# Patient Record
Sex: FEMALE | Race: BLACK OR AFRICAN AMERICAN | NOT HISPANIC OR LATINO
[De-identification: names, ages, dates, MRNs, and addresses within clinical notes are randomized per-mention and may not be internally consistent; named-entity substitution may affect disease eponyms.]

---

## 2018-11-29 ENCOUNTER — ASOB RESULT (OUTPATIENT)
Age: 23
End: 2018-11-29

## 2018-11-29 ENCOUNTER — APPOINTMENT (OUTPATIENT)
Dept: OBGYN | Facility: CLINIC | Age: 23
End: 2018-11-29
Payer: COMMERCIAL

## 2018-11-29 VITALS
WEIGHT: 231.56 LBS | SYSTOLIC BLOOD PRESSURE: 136 MMHG | BODY MASS INDEX: 39.53 KG/M2 | DIASTOLIC BLOOD PRESSURE: 83 MMHG | HEIGHT: 64 IN | HEART RATE: 90 BPM

## 2018-11-29 DIAGNOSIS — Z78.9 OTHER SPECIFIED HEALTH STATUS: ICD-10-CM

## 2018-11-29 DIAGNOSIS — Z83.3 FAMILY HISTORY OF DIABETES MELLITUS: ICD-10-CM

## 2018-11-29 DIAGNOSIS — Z82.49 FAMILY HISTORY OF ISCHEMIC HEART DISEASE AND OTHER DISEASES OF THE CIRCULATORY SYSTEM: ICD-10-CM

## 2018-11-29 LAB
ABO + RH PNL BLD: NORMAL
BASOPHILS # BLD AUTO: 0.02 K/UL
BASOPHILS NFR BLD AUTO: 0.2 %
BLD GP AB SCN SERPL QL: NORMAL
CMV IGG SERPL QL: <0.2 U/ML
CMV IGG SERPL-IMP: NEGATIVE
CMV IGM SERPL QL: <8 AU/ML
CMV IGM SERPL QL: NEGATIVE
EOSINOPHIL # BLD AUTO: 0.13 K/UL
EOSINOPHIL NFR BLD AUTO: 1.4 %
HBV SURFACE AG SER QL: NONREACTIVE
HCT VFR BLD CALC: 40.4 %
HCV AB SER QL: NONREACTIVE
HCV S/CO RATIO: 0.06 S/CO
HGB BLD-MCNC: 13.2 G/DL
HIV1+2 AB SPEC QL IA.RAPID: NONREACTIVE
IMM GRANULOCYTES NFR BLD AUTO: 0.2 %
LYMPHOCYTES # BLD AUTO: 2.38 K/UL
LYMPHOCYTES NFR BLD AUTO: 25.7 %
MAN DIFF?: NORMAL
MCHC RBC-ENTMCNC: 27 PG
MCHC RBC-ENTMCNC: 32.7 GM/DL
MCV RBC AUTO: 82.6 FL
MONOCYTES # BLD AUTO: 0.47 K/UL
MONOCYTES NFR BLD AUTO: 5.1 %
NEUTROPHILS # BLD AUTO: 6.23 K/UL
NEUTROPHILS NFR BLD AUTO: 67.4 %
PLATELET # BLD AUTO: 323 K/UL
RBC # BLD: 4.89 M/UL
RBC # FLD: 15.4 %
T PALLIDUM AB SER QL IA: NEGATIVE
VZV AB TITR SER: NEGATIVE
VZV IGG SER IF-ACNC: 19.3 INDEX
WBC # FLD AUTO: 9.25 K/UL

## 2018-11-29 PROCEDURE — 99202 OFFICE O/P NEW SF 15 MIN: CPT

## 2018-11-29 PROCEDURE — 76817 TRANSVAGINAL US OBSTETRIC: CPT

## 2018-12-12 LAB
AR GENE MUT ANL BLD/T: NEGATIVE
B19V IGG SER QL IA: 0.2 INDEX
B19V IGG+IGM SER-IMP: NEGATIVE
B19V IGG+IGM SER-IMP: NORMAL
B19V IGM FLD-ACNC: 0.1 INDEX
B19V IGM SER-ACNC: NEGATIVE
BACTERIA UR CULT: NORMAL
C TRACH RRNA SPEC QL NAA+PROBE: NOT DETECTED
CFTR MUT TESTED BLD/T: NEGATIVE
FMR1 GENE MUT ANL BLD/T: NORMAL
HGB A MFR BLD: 97.4 %
HGB A2 MFR BLD: 2.6 %
HGB FRACT BLD-IMP: NORMAL
LEAD BLD-MCNC: <1 UG/DL
N GONORRHOEA RRNA SPEC QL NAA+PROBE: NOT DETECTED
RUBV IGG FLD-ACNC: 1.1 INDEX
RUBV IGG SER-IMP: POSITIVE
SOURCE AMPLIFICATION: NORMAL

## 2018-12-31 ENCOUNTER — APPOINTMENT (OUTPATIENT)
Dept: OBGYN | Facility: CLINIC | Age: 23
End: 2018-12-31
Payer: COMMERCIAL

## 2018-12-31 ENCOUNTER — NON-APPOINTMENT (OUTPATIENT)
Age: 23
End: 2018-12-31

## 2018-12-31 VITALS
BODY MASS INDEX: 40.97 KG/M2 | DIASTOLIC BLOOD PRESSURE: 76 MMHG | WEIGHT: 240 LBS | SYSTOLIC BLOOD PRESSURE: 143 MMHG | HEIGHT: 64 IN

## 2018-12-31 VITALS — SYSTOLIC BLOOD PRESSURE: 131 MMHG | DIASTOLIC BLOOD PRESSURE: 76 MMHG

## 2018-12-31 PROCEDURE — 0502F SUBSEQUENT PRENATAL CARE: CPT

## 2019-01-16 ENCOUNTER — APPOINTMENT (OUTPATIENT)
Dept: ANTEPARTUM | Facility: CLINIC | Age: 24
End: 2019-01-16
Payer: COMMERCIAL

## 2019-01-16 ENCOUNTER — ASOB RESULT (OUTPATIENT)
Age: 24
End: 2019-01-16

## 2019-01-16 ENCOUNTER — LABORATORY RESULT (OUTPATIENT)
Age: 24
End: 2019-01-16

## 2019-01-16 PROCEDURE — 36416 COLLJ CAPILLARY BLOOD SPEC: CPT

## 2019-01-16 PROCEDURE — 76801 OB US < 14 WKS SINGLE FETUS: CPT

## 2019-01-16 PROCEDURE — 76813 OB US NUCHAL MEAS 1 GEST: CPT

## 2019-01-31 ENCOUNTER — NON-APPOINTMENT (OUTPATIENT)
Age: 24
End: 2019-01-31

## 2019-01-31 ENCOUNTER — APPOINTMENT (OUTPATIENT)
Dept: OBGYN | Facility: CLINIC | Age: 24
End: 2019-01-31
Payer: COMMERCIAL

## 2019-01-31 VITALS
SYSTOLIC BLOOD PRESSURE: 128 MMHG | BODY MASS INDEX: 38.99 KG/M2 | DIASTOLIC BLOOD PRESSURE: 78 MMHG | WEIGHT: 234 LBS | HEIGHT: 65 IN

## 2019-01-31 PROCEDURE — 0501F PRENATAL FLOW SHEET: CPT

## 2019-02-04 LAB
2ND TRIMESTER DATA: NORMAL
AFP PNL SERPL: NORMAL
AFP SERPL-ACNC: NORMAL
CLINICAL BIOCHEMIST REVIEW: NORMAL
NOTES NTD: NORMAL

## 2019-03-04 ENCOUNTER — APPOINTMENT (OUTPATIENT)
Dept: ANTEPARTUM | Facility: CLINIC | Age: 24
End: 2019-03-04
Payer: COMMERCIAL

## 2019-03-04 ENCOUNTER — ASOB RESULT (OUTPATIENT)
Age: 24
End: 2019-03-04

## 2019-03-04 PROCEDURE — 76811 OB US DETAILED SNGL FETUS: CPT

## 2019-03-05 ENCOUNTER — NON-APPOINTMENT (OUTPATIENT)
Age: 24
End: 2019-03-05

## 2019-03-05 ENCOUNTER — APPOINTMENT (OUTPATIENT)
Dept: OBGYN | Facility: CLINIC | Age: 24
End: 2019-03-05

## 2019-03-05 VITALS
DIASTOLIC BLOOD PRESSURE: 82 MMHG | BODY MASS INDEX: 40.15 KG/M2 | SYSTOLIC BLOOD PRESSURE: 137 MMHG | WEIGHT: 241 LBS | HEIGHT: 65 IN

## 2019-03-11 ENCOUNTER — ASOB RESULT (OUTPATIENT)
Age: 24
End: 2019-03-11

## 2019-03-11 ENCOUNTER — APPOINTMENT (OUTPATIENT)
Dept: ANTEPARTUM | Facility: CLINIC | Age: 24
End: 2019-03-11
Payer: COMMERCIAL

## 2019-03-11 PROCEDURE — 76817 TRANSVAGINAL US OBSTETRIC: CPT

## 2019-03-11 PROCEDURE — 76816 OB US FOLLOW-UP PER FETUS: CPT

## 2019-03-25 ENCOUNTER — TRANSCRIPTION ENCOUNTER (OUTPATIENT)
Age: 24
End: 2019-03-25

## 2019-04-02 ENCOUNTER — NON-APPOINTMENT (OUTPATIENT)
Age: 24
End: 2019-04-02

## 2019-04-02 ENCOUNTER — APPOINTMENT (OUTPATIENT)
Dept: OBGYN | Facility: CLINIC | Age: 24
End: 2019-04-02
Payer: COMMERCIAL

## 2019-04-02 VITALS
BODY MASS INDEX: 40.15 KG/M2 | WEIGHT: 241 LBS | DIASTOLIC BLOOD PRESSURE: 84 MMHG | HEIGHT: 65 IN | SYSTOLIC BLOOD PRESSURE: 131 MMHG

## 2019-04-02 PROCEDURE — 0502F SUBSEQUENT PRENATAL CARE: CPT

## 2019-05-02 ENCOUNTER — APPOINTMENT (OUTPATIENT)
Dept: OBGYN | Facility: CLINIC | Age: 24
End: 2019-05-02
Payer: COMMERCIAL

## 2019-05-02 ENCOUNTER — NON-APPOINTMENT (OUTPATIENT)
Age: 24
End: 2019-05-02

## 2019-05-02 VITALS
SYSTOLIC BLOOD PRESSURE: 139 MMHG | BODY MASS INDEX: 41.53 KG/M2 | WEIGHT: 249.25 LBS | HEIGHT: 65 IN | DIASTOLIC BLOOD PRESSURE: 88 MMHG

## 2019-05-02 PROCEDURE — 0502F SUBSEQUENT PRENATAL CARE: CPT

## 2019-05-16 ENCOUNTER — OTHER (OUTPATIENT)
Age: 24
End: 2019-05-16

## 2019-05-16 ENCOUNTER — APPOINTMENT (OUTPATIENT)
Dept: OBGYN | Facility: CLINIC | Age: 24
End: 2019-05-16
Payer: COMMERCIAL

## 2019-05-16 VITALS
DIASTOLIC BLOOD PRESSURE: 91 MMHG | BODY MASS INDEX: 41.88 KG/M2 | WEIGHT: 251.38 LBS | HEIGHT: 65 IN | SYSTOLIC BLOOD PRESSURE: 142 MMHG

## 2019-05-16 VITALS — DIASTOLIC BLOOD PRESSURE: 89 MMHG | SYSTOLIC BLOOD PRESSURE: 143 MMHG

## 2019-05-16 PROCEDURE — 90715 TDAP VACCINE 7 YRS/> IM: CPT

## 2019-05-16 PROCEDURE — 90471 IMMUNIZATION ADMIN: CPT

## 2019-05-16 PROCEDURE — 0502F SUBSEQUENT PRENATAL CARE: CPT

## 2019-05-29 ENCOUNTER — APPOINTMENT (OUTPATIENT)
Dept: ANTEPARTUM | Facility: CLINIC | Age: 24
End: 2019-05-29
Payer: COMMERCIAL

## 2019-05-29 ENCOUNTER — OUTPATIENT (OUTPATIENT)
Dept: OUTPATIENT SERVICES | Facility: HOSPITAL | Age: 24
LOS: 1 days | End: 2019-05-29

## 2019-05-29 ENCOUNTER — ASOB RESULT (OUTPATIENT)
Age: 24
End: 2019-05-29

## 2019-05-29 ENCOUNTER — INPATIENT (INPATIENT)
Facility: HOSPITAL | Age: 24
LOS: 8 days | Discharge: HOME | End: 2019-06-07
Attending: OBSTETRICS & GYNECOLOGY | Admitting: OBSTETRICS & GYNECOLOGY
Payer: COMMERCIAL

## 2019-05-29 VITALS — TEMPERATURE: 98 F

## 2019-05-29 DIAGNOSIS — I10 ESSENTIAL (PRIMARY) HYPERTENSION: ICD-10-CM

## 2019-05-29 DIAGNOSIS — O41.00X0 OLIGOHYDRAMNIOS, UNSPECIFIED TRIMESTER, NOT APPLICABLE OR UNSPECIFIED: ICD-10-CM

## 2019-05-29 DIAGNOSIS — O26.899 OTHER SPECIFIED PREGNANCY RELATED CONDITIONS, UNSPECIFIED TRIMESTER: ICD-10-CM

## 2019-05-29 DIAGNOSIS — O36.5990 MATERNAL CARE FOR OTHER KNOWN OR SUSPECTED POOR FETAL GROWTH, UNSPECIFIED TRIMESTER, NOT APPLICABLE OR UNSPECIFIED: ICD-10-CM

## 2019-05-29 DIAGNOSIS — O24.419 GESTATIONAL DIABETES MELLITUS IN PREGNANCY, UNSPECIFIED CONTROL: ICD-10-CM

## 2019-05-29 DIAGNOSIS — Z3A.00 WEEKS OF GESTATION OF PREGNANCY NOT SPECIFIED: ICD-10-CM

## 2019-05-29 LAB
ALBUMIN SERPL ELPH-MCNC: 3.5 G/DL — SIGNIFICANT CHANGE UP (ref 3.3–5)
ALP SERPL-CCNC: 141 U/L — HIGH (ref 40–120)
ALT FLD-CCNC: 20 U/L — SIGNIFICANT CHANGE UP (ref 4–33)
ANION GAP SERPL CALC-SCNC: 17 MMO/L — HIGH (ref 7–14)
APPEARANCE UR: CLEAR — SIGNIFICANT CHANGE UP
APTT BLD: 34.1 SEC — SIGNIFICANT CHANGE UP (ref 27.5–36.3)
AST SERPL-CCNC: 27 U/L — SIGNIFICANT CHANGE UP (ref 4–32)
BACTERIA # UR AUTO: SIGNIFICANT CHANGE UP
BASOPHILS # BLD AUTO: 0.04 K/UL — SIGNIFICANT CHANGE UP (ref 0–0.2)
BASOPHILS NFR BLD AUTO: 0.4 % — SIGNIFICANT CHANGE UP (ref 0–2)
BILIRUB SERPL-MCNC: < 0.2 MG/DL — LOW (ref 0.2–1.2)
BILIRUB UR-MCNC: NEGATIVE — SIGNIFICANT CHANGE UP
BLD GP AB SCN SERPL QL: NEGATIVE — SIGNIFICANT CHANGE UP
BLOOD UR QL VISUAL: NEGATIVE — SIGNIFICANT CHANGE UP
BUN SERPL-MCNC: 7 MG/DL — SIGNIFICANT CHANGE UP (ref 7–23)
CALCIUM SERPL-MCNC: 9.6 MG/DL — SIGNIFICANT CHANGE UP (ref 8.4–10.5)
CHLORIDE SERPL-SCNC: 101 MMOL/L — SIGNIFICANT CHANGE UP (ref 98–107)
CO2 SERPL-SCNC: 19 MMOL/L — LOW (ref 22–31)
COLOR SPEC: SIGNIFICANT CHANGE UP
CREAT SERPL-MCNC: 0.42 MG/DL — LOW (ref 0.5–1.3)
EOSINOPHIL # BLD AUTO: 0.19 K/UL — SIGNIFICANT CHANGE UP (ref 0–0.5)
EOSINOPHIL NFR BLD AUTO: 1.7 % — SIGNIFICANT CHANGE UP (ref 0–6)
FIBRINOGEN PPP-MCNC: 685 MG/DL — HIGH (ref 350–510)
GLUCOSE BLDC GLUCOMTR-MCNC: 107 MG/DL — HIGH (ref 70–99)
GLUCOSE BLDC GLUCOMTR-MCNC: 78 MG/DL — SIGNIFICANT CHANGE UP (ref 70–99)
GLUCOSE SERPL-MCNC: 68 MG/DL — LOW (ref 70–99)
GLUCOSE UR-MCNC: NEGATIVE — SIGNIFICANT CHANGE UP
HCT VFR BLD CALC: 38.5 % — SIGNIFICANT CHANGE UP (ref 34.5–45)
HGB BLD-MCNC: 12.6 G/DL — SIGNIFICANT CHANGE UP (ref 11.5–15.5)
HYALINE CASTS # UR AUTO: NEGATIVE — SIGNIFICANT CHANGE UP
IMM GRANULOCYTES NFR BLD AUTO: 0.8 % — SIGNIFICANT CHANGE UP (ref 0–1.5)
INR BLD: 0.9 — SIGNIFICANT CHANGE UP (ref 0.88–1.17)
KETONES UR-MCNC: NEGATIVE — SIGNIFICANT CHANGE UP
LDH SERPL L TO P-CCNC: 209 U/L — SIGNIFICANT CHANGE UP (ref 135–225)
LEUKOCYTE ESTERASE UR-ACNC: SIGNIFICANT CHANGE UP
LYMPHOCYTES # BLD AUTO: 2.57 K/UL — SIGNIFICANT CHANGE UP (ref 1–3.3)
LYMPHOCYTES # BLD AUTO: 23.5 % — SIGNIFICANT CHANGE UP (ref 13–44)
MCHC RBC-ENTMCNC: 27.3 PG — SIGNIFICANT CHANGE UP (ref 27–34)
MCHC RBC-ENTMCNC: 32.7 % — SIGNIFICANT CHANGE UP (ref 32–36)
MCV RBC AUTO: 83.5 FL — SIGNIFICANT CHANGE UP (ref 80–100)
MONOCYTES # BLD AUTO: 0.74 K/UL — SIGNIFICANT CHANGE UP (ref 0–0.9)
MONOCYTES NFR BLD AUTO: 6.8 % — SIGNIFICANT CHANGE UP (ref 2–14)
NEUTROPHILS # BLD AUTO: 7.31 K/UL — SIGNIFICANT CHANGE UP (ref 1.8–7.4)
NEUTROPHILS NFR BLD AUTO: 66.8 % — SIGNIFICANT CHANGE UP (ref 43–77)
NITRITE UR-MCNC: NEGATIVE — SIGNIFICANT CHANGE UP
NRBC # FLD: 0 K/UL — SIGNIFICANT CHANGE UP (ref 0–0)
PH UR: 7 — SIGNIFICANT CHANGE UP (ref 5–8)
PLATELET # BLD AUTO: 285 K/UL — SIGNIFICANT CHANGE UP (ref 150–400)
PMV BLD: 11.8 FL — SIGNIFICANT CHANGE UP (ref 7–13)
POTASSIUM SERPL-MCNC: 4.1 MMOL/L — SIGNIFICANT CHANGE UP (ref 3.5–5.3)
POTASSIUM SERPL-SCNC: 4.1 MMOL/L — SIGNIFICANT CHANGE UP (ref 3.5–5.3)
PROT SERPL-MCNC: 7.2 G/DL — SIGNIFICANT CHANGE UP (ref 6–8.3)
PROT UR-MCNC: 15.1 MG/DL — SIGNIFICANT CHANGE UP
PROT UR-MCNC: NEGATIVE — SIGNIFICANT CHANGE UP
PROTHROM AB SERPL-ACNC: 10.2 SEC — SIGNIFICANT CHANGE UP (ref 9.8–13.1)
RBC # BLD: 4.61 M/UL — SIGNIFICANT CHANGE UP (ref 3.8–5.2)
RBC # FLD: 14.6 % — HIGH (ref 10.3–14.5)
RBC CASTS # UR COMP ASSIST: SIGNIFICANT CHANGE UP (ref 0–?)
RH IG SCN BLD-IMP: POSITIVE — SIGNIFICANT CHANGE UP
RH IG SCN BLD-IMP: POSITIVE — SIGNIFICANT CHANGE UP
SODIUM SERPL-SCNC: 137 MMOL/L — SIGNIFICANT CHANGE UP (ref 135–145)
SP GR SPEC: 1.01 — SIGNIFICANT CHANGE UP (ref 1–1.04)
SQUAMOUS # UR AUTO: SIGNIFICANT CHANGE UP
URATE SERPL-MCNC: 6.6 MG/DL — SIGNIFICANT CHANGE UP (ref 2.5–7)
UROBILINOGEN FLD QL: NORMAL — SIGNIFICANT CHANGE UP
WBC # BLD: 10.94 K/UL — HIGH (ref 3.8–10.5)
WBC # FLD AUTO: 10.94 K/UL — HIGH (ref 3.8–10.5)
WBC UR QL: HIGH (ref 0–?)

## 2019-05-29 PROCEDURE — 76816 OB US FOLLOW-UP PER FETUS: CPT

## 2019-05-29 PROCEDURE — 76818 FETAL BIOPHYS PROFILE W/NST: CPT | Mod: 26

## 2019-05-29 PROCEDURE — 99222 1ST HOSP IP/OBS MODERATE 55: CPT

## 2019-05-29 PROCEDURE — 76820 UMBILICAL ARTERY ECHO: CPT

## 2019-05-29 RX ORDER — SODIUM CHLORIDE 9 MG/ML
1000 INJECTION, SOLUTION INTRAVENOUS
Refills: 0 | Status: DISCONTINUED | OUTPATIENT
Start: 2019-05-29 | End: 2019-05-30

## 2019-05-29 RX ADMIN — Medication 12 MILLIGRAM(S): at 16:51

## 2019-05-29 RX ADMIN — SODIUM CHLORIDE 125 MILLILITER(S): 9 INJECTION, SOLUTION INTRAVENOUS at 22:50

## 2019-05-29 RX ADMIN — SODIUM CHLORIDE 125 MILLILITER(S): 9 INJECTION, SOLUTION INTRAVENOUS at 17:11

## 2019-05-29 NOTE — OB PROVIDER H&P - ALERT: PERTINENT HISTORY
20 Week Level II Sonogram/1st Trimester Sonogram/BioPhysical Profile(s)/Non Invasive Prenatal Screen (NIPS)/Fetal Non-Stress Test (NST)/Fetal Sonogram

## 2019-05-29 NOTE — OB PROVIDER H&P - PROBLEM SELECTOR PLAN 2
- Continue to closely monitor BPs, 24 hour urine protein  - Monitor for signs/symptoms of pre-ecclampsia

## 2019-05-29 NOTE — OB PROVIDER H&P - HISTORY OF PRESENT ILLNESS
25yo  at 32w5d (JOSE C 19) presenting from ATU where IUGR (3%ile) and oligohydramnios (SALVADOR 4.07) was found. Patient reports good fetal movement. She denies any LOF, vaginal bleeding or cramping. Patient reports history of chronic HTN with BPs since 1st trimester being 120s-140s systolic, with a normal 24 hour urine collection 25yo  at 32w5d (JOSE C 19) presenting from ATU where IUGR (3%ile) and oligohydramnios (SALVADOR 4.07) was found and a category II tracing was noted. Patient reports good fetal movement. She denies any LOF, vaginal bleeding or cramping. Patient reports history of chronic HTN with BPs since 1st trimester being 120s-140s systolic, with a normal 24 hour urine collection 210mg/24hr on . She had abnormal 3 hour GCT last week, now as GDMA1.     OBGYN:  - hx of medical TOP. Denies any uterine fibroids, ovarian cysts or abnormal pap smears.  PMH: cHTN, diagnosed in 1st trimester   Surg: denies  All: NKDA  Meds: PNV  Social: denies any alcohol, smoking or drug use   Psych: denies any anxiety or depression  Accepts blood transfusion

## 2019-05-29 NOTE — OB PROVIDER H&P - ASSESSMENT
23yo  at 32w5d (JOSE C 19) presenting from ATU where IUGR (3%ile) and oligohydramnios (SALVADOR 4.07) was found and a category II tracing was noted, in stable condition.

## 2019-05-29 NOTE — OB PROVIDER H&P - NSHPPHYSICALEXAM_GEN_ALL_CORE
VS  T(C): 37.1 (05-29-19 @ 15:35)  HR: 79 (05-29-19 @ 16:50)  BP: 139/73 (05-29-19 @ 16:50)  RR: 17 (05-29-19 @ 15:35)  SpO2: --    Physical Exam:  General: NAD  Abdomen: Soft, gravid, non-tender, non-distended  Ext: No pain or swelling

## 2019-05-29 NOTE — OB PROVIDER H&P - CURRENT PREGNANCY COMPLICATIONS, OB PROFILE
Maternal Unknown GBS/Gestational Age less than 36 Weeks Maternal Unknown GBS/Gestational Age less than 36 Weeks/Intrauterine Growth Restriction/Gestational Diabetes/Oligohydramnios

## 2019-05-30 ENCOUNTER — APPOINTMENT (OUTPATIENT)
Dept: OBGYN | Facility: CLINIC | Age: 24
End: 2019-05-30

## 2019-05-30 LAB
GLUCOSE BLDC GLUCOMTR-MCNC: 100 MG/DL — HIGH (ref 70–99)
GLUCOSE BLDC GLUCOMTR-MCNC: 108 MG/DL — HIGH (ref 70–99)
GLUCOSE BLDC GLUCOMTR-MCNC: 136 MG/DL — HIGH (ref 70–99)
GLUCOSE BLDC GLUCOMTR-MCNC: 85 MG/DL — SIGNIFICANT CHANGE UP (ref 70–99)
GLUCOSE BLDC GLUCOMTR-MCNC: 96 MG/DL — SIGNIFICANT CHANGE UP (ref 70–99)
M PROTEIN 24H MFR UR ELPH: 510 MG/24 HR — SIGNIFICANT CHANGE UP
SPECIMEN SOURCE: SIGNIFICANT CHANGE UP
SPECIMEN VOL 24H UR: 1700 ML — SIGNIFICANT CHANGE UP

## 2019-05-30 PROCEDURE — 99234 HOSP IP/OBS SM DT SF/LOW 45: CPT

## 2019-05-30 RX ORDER — DEXTROSE 50 % IN WATER 50 %
15 SYRINGE (ML) INTRAVENOUS ONCE
Refills: 0 | Status: DISCONTINUED | OUTPATIENT
Start: 2019-05-30 | End: 2019-06-03

## 2019-05-30 RX ORDER — DEXTROSE 50 % IN WATER 50 %
25 SYRINGE (ML) INTRAVENOUS ONCE
Refills: 0 | Status: DISCONTINUED | OUTPATIENT
Start: 2019-05-30 | End: 2019-06-03

## 2019-05-30 RX ORDER — DEXTROSE 50 % IN WATER 50 %
12.5 SYRINGE (ML) INTRAVENOUS ONCE
Refills: 0 | Status: DISCONTINUED | OUTPATIENT
Start: 2019-05-30 | End: 2019-06-03

## 2019-05-30 RX ORDER — INSULIN DETEMIR 100/ML (3)
40 INSULIN PEN (ML) SUBCUTANEOUS AT BEDTIME
Refills: 0 | Status: DISCONTINUED | OUTPATIENT
Start: 2019-05-30 | End: 2019-06-03

## 2019-05-30 RX ORDER — GLUCAGON INJECTION, SOLUTION 0.5 MG/.1ML
1 INJECTION, SOLUTION SUBCUTANEOUS ONCE
Refills: 0 | Status: DISCONTINUED | OUTPATIENT
Start: 2019-05-30 | End: 2019-06-03

## 2019-05-30 RX ORDER — SODIUM CHLORIDE 9 MG/ML
1000 INJECTION, SOLUTION INTRAVENOUS
Refills: 0 | Status: DISCONTINUED | OUTPATIENT
Start: 2019-05-30 | End: 2019-06-03

## 2019-05-30 RX ORDER — INSULIN LISPRO 100/ML
14 VIAL (ML) SUBCUTANEOUS
Refills: 0 | Status: DISCONTINUED | OUTPATIENT
Start: 2019-05-30 | End: 2019-06-03

## 2019-05-30 RX ADMIN — Medication 14 UNIT(S): at 17:29

## 2019-05-30 RX ADMIN — SODIUM CHLORIDE 125 MILLILITER(S): 9 INJECTION, SOLUTION INTRAVENOUS at 11:14

## 2019-05-30 RX ADMIN — Medication 14 UNIT(S): at 11:15

## 2019-05-30 RX ADMIN — Medication 40 UNIT(S): at 22:23

## 2019-05-30 RX ADMIN — Medication 12 MILLIGRAM(S): at 17:42

## 2019-05-30 NOTE — PROGRESS NOTE ADULT - ASSESSMENT
23yo  at 32w6d with chronic HTN, admitted with elevated BPs, IUGR (3%ile) and Category 2 tracing, concern for possible superimposed preeclampsia.  BPs mostly normal to mild range, no sx of preeclampsia, no significant lab abnormalities. Fetal tracing concerning with intermittent periods of minimal variability, but overall reassuring with moderate variability and accelerations 23yo  at 32w6d with chronic HTN, admitted with elevated BPs, IUGR (3%ile), oligohydramnios, and Category 2 tracing, concern for possible superimposed preeclampsia.  BPs mostly normal to mild range, no sx of preeclampsia, no significant lab abnormalities. Fetal tracing concerning with intermittent periods of minimal variability, but overall reassuring with moderate variability and accelerations

## 2019-05-31 ENCOUNTER — ASOB RESULT (OUTPATIENT)
Age: 24
End: 2019-05-31

## 2019-05-31 ENCOUNTER — APPOINTMENT (OUTPATIENT)
Dept: ANTEPARTUM | Facility: HOSPITAL | Age: 24
End: 2019-05-31
Payer: COMMERCIAL

## 2019-05-31 ENCOUNTER — OUTPATIENT (OUTPATIENT)
Dept: OUTPATIENT SERVICES | Facility: HOSPITAL | Age: 24
LOS: 1 days | End: 2019-05-31

## 2019-05-31 ENCOUNTER — TRANSCRIPTION ENCOUNTER (OUTPATIENT)
Age: 24
End: 2019-05-31

## 2019-05-31 DIAGNOSIS — O11.9 PRE-EXISTING HYPERTENSION WITH PRE-ECLAMPSIA, UNSPECIFIED TRIMESTER: ICD-10-CM

## 2019-05-31 PROBLEM — I10 ESSENTIAL (PRIMARY) HYPERTENSION: Chronic | Status: ACTIVE | Noted: 2019-05-29

## 2019-05-31 LAB
GLUCOSE BLDC GLUCOMTR-MCNC: 100 MG/DL — HIGH (ref 70–99)
GLUCOSE BLDC GLUCOMTR-MCNC: 103 MG/DL — HIGH (ref 70–99)
GLUCOSE BLDC GLUCOMTR-MCNC: 110 MG/DL — HIGH (ref 70–99)
GLUCOSE BLDC GLUCOMTR-MCNC: 123 MG/DL — HIGH (ref 70–99)
GLUCOSE BLDC GLUCOMTR-MCNC: 79 MG/DL — SIGNIFICANT CHANGE UP (ref 70–99)
GLUCOSE BLDC GLUCOMTR-MCNC: 85 MG/DL — SIGNIFICANT CHANGE UP (ref 70–99)
GLUCOSE BLDC GLUCOMTR-MCNC: 88 MG/DL — SIGNIFICANT CHANGE UP (ref 70–99)
HBA1C BLD-MCNC: 5.9 % — HIGH (ref 4–5.6)

## 2019-05-31 PROCEDURE — 76820 UMBILICAL ARTERY ECHO: CPT | Mod: 26

## 2019-05-31 PROCEDURE — 99234 HOSP IP/OBS SM DT SF/LOW 45: CPT

## 2019-05-31 PROCEDURE — 76819 FETAL BIOPHYS PROFIL W/O NST: CPT | Mod: 26

## 2019-05-31 RX ORDER — HEPARIN SODIUM 5000 [USP'U]/ML
5000 INJECTION INTRAVENOUS; SUBCUTANEOUS EVERY 12 HOURS
Refills: 0 | Status: DISCONTINUED | OUTPATIENT
Start: 2019-05-31 | End: 2019-06-07

## 2019-05-31 RX ADMIN — Medication 14 UNIT(S): at 17:21

## 2019-05-31 RX ADMIN — Medication 14 UNIT(S): at 08:05

## 2019-05-31 RX ADMIN — Medication 40 UNIT(S): at 22:35

## 2019-05-31 RX ADMIN — HEPARIN SODIUM 5000 UNIT(S): 5000 INJECTION INTRAVENOUS; SUBCUTANEOUS at 18:11

## 2019-05-31 RX ADMIN — Medication 14 UNIT(S): at 12:16

## 2019-05-31 NOTE — DISCHARGE NOTE ANTEPARTUM - PLAN OF CARE
Continue  testing twice weekly and prenatal care with OB -Follow up with ATU on  at 230pm for  testing  -Follow up with OB within the week  -continue glucose monitoring and log  -Continue taking BP at home if >140/90 please return to hospital  -Return to hospital with headaches, visual changes, RUQ pain, N/V, contx, VB, LOF or decreased fetal movement

## 2019-05-31 NOTE — DIETITIAN INITIAL EVALUATION ADULT. - NS AS NUTRI INTERV ED CONTENT
Extensive diet education and written instructions provided on Consistent Carbohydrate diet, Carb Counting, Label Reading and Portion Control.

## 2019-05-31 NOTE — DISCHARGE NOTE ANTEPARTUM - MEDICATION SUMMARY - MEDICATIONS TO TAKE
I will START or STAY ON the medications listed below when I get home from the hospital:    PNV Prenatal Plus oral tablet  -- 1 tab(s) by mouth once a day  -- Indication: For Pregnancy

## 2019-05-31 NOTE — DISCHARGE NOTE ANTEPARTUM - HOSPITAL COURSE
23yo  at 32w6d with chronic HTN, admitted with elevated BPs, IUGR (3%ile), oligohydramnios, and Category 2 tracing, concern for possible superimposed preeclampsia.  BPs normal to mild range, no sx of preeclampsia, no significant lab abnormalities. 23yo  at 34wks with chronic HTN, admitted with elevated BPs, IUGR (3%ile), oligohydramnios, and Category 2 tracing, now w/ superimposed preeclampsia (24hr urine 510).  BPs normal to mild range, no sx of preeclampsia, no significant lab abnormalities. Denies headaches, visual changes RUQ pain, N/V. Denies contx, VB LOF. Endorses good fetal movement. ATU sono : cephalic presentation/ posterior placenta/ SALVADOR 7.8/ BPP 8/8. EFW from : 1592grams 3rd percentile; UA dopplers: slightly elevated 3.5. PNC c/b GDMA1 glucose has been WNL. Patient and fetus stable and ready to be discharged home with close outpatient monitoring with ATU and OB provider, ATU scheduled apt for  and OB follow up early next week, pt to have twice weekly fetal testing.

## 2019-05-31 NOTE — DIETITIAN INITIAL EVALUATION ADULT. - PERTINENT MEDS FT
dextrose 40% Gel PRN  dextrose 5%.  dextrose 50% Injectable  dextrose 50% Injectable  dextrose 50% Injectable  glucagon  Injectable PRN  insulin detemir injectable (LEVEMIR)  insulin lispro Injectable (HumaLOG)

## 2019-05-31 NOTE — PROGRESS NOTE ADULT - ASSESSMENT
25yo  at 33wks with chronic HTN, admitted with elevated BPs, IUGR (3%ile), oligohydramnios, and Category 2 tracing, now w/ superimposed preeclampsia (24hr urine 510). BPs mostly normal to mild range, no sx of preeclampsia, . Fetal tracing reassuring.

## 2019-05-31 NOTE — DIETITIAN INITIAL EVALUATION ADULT. - ENERGY NEEDS
Height (cm): 162.56 (29 May 2019 15:35)  Weight (kg): 104.3 (29 May 2019 15:35)  BMI (kg/m2): 39.5 (29 May 2019 15:35)  IBW: 54.5kg +/-10%

## 2019-05-31 NOTE — DISCHARGE NOTE ANTEPARTUM - PATIENT PORTAL LINK FT
You can access [facility], offered by [facility], by registering with the following website: [email or web address]

## 2019-05-31 NOTE — DISCHARGE NOTE ANTEPARTUM - CARE PLAN
Principal Discharge DX:	IUGR (intrauterine growth restriction) affecting care of mother Principal Discharge DX:	IUGR (intrauterine growth restriction) affecting care of mother  Goal:	Continue  testing twice weekly and prenatal care with OB  Assessment and plan of treatment:	-Follow up with ATU on  at 230pm for  testing  -Follow up with OB within the week  -continue glucose monitoring and log  -Continue taking BP at home if >140/90 please return to hospital  -Return to hospital with headaches, visual changes, RUQ pain, N/V, contx, VB, LOF or decreased fetal movement

## 2019-05-31 NOTE — DIETITIAN INITIAL EVALUATION ADULT. - OTHER INFO
Patient seen for nutrition consult Per chart review patient 24year old female admitted at 32 weeks 5days gestation. Patient reports good appetite and PO intake in-house. Tolerating diet. NKFA. Reports she was recently diagnoses with GDM last week on Wednesday (5/22). Provided extensive diet education regarding gestational Consistent Carbohydrate diet, portion control and carb counting. Patient attentive during education and verbalizes understanding. Written diet education materials given as well.

## 2019-05-31 NOTE — DISCHARGE NOTE ANTEPARTUM - CARE PROVIDERS DIRECT ADDRESSES
[email or web address]

## 2019-05-31 NOTE — DISCHARGE NOTE ANTEPARTUM - CARE PROVIDER_API CALL
Norma Pickering (MD)  Obstetrics and Gynecology  [facility] [place] [address]  Phone: [phone]  Fax: [phone]  Follow Up Time:

## 2019-05-31 NOTE — DIETITIAN INITIAL EVALUATION ADULT. - PERTINENT LABORATORY DATA
05-31 Na n/a   Glu n/a   K+ n/a   Cr n/a   BUN n/a   Phos n/a   Alb n/a   PAB n/a   Hgb n/a   Hct n/a   HgA1C 5.9 %<H>  Hemoglobin A1C, Whole Blood: 5.9 % (05-31-19 @ 05:56)

## 2019-06-01 LAB
GLUCOSE BLDC GLUCOMTR-MCNC: 73 MG/DL — SIGNIFICANT CHANGE UP (ref 70–99)
GLUCOSE BLDC GLUCOMTR-MCNC: 73 MG/DL — SIGNIFICANT CHANGE UP (ref 70–99)
GLUCOSE BLDC GLUCOMTR-MCNC: 74 MG/DL — SIGNIFICANT CHANGE UP (ref 70–99)
GLUCOSE BLDC GLUCOMTR-MCNC: 84 MG/DL — SIGNIFICANT CHANGE UP (ref 70–99)
GLUCOSE BLDC GLUCOMTR-MCNC: 86 MG/DL — SIGNIFICANT CHANGE UP (ref 70–99)
GLUCOSE BLDC GLUCOMTR-MCNC: 90 MG/DL — SIGNIFICANT CHANGE UP (ref 70–99)
GLUCOSE BLDC GLUCOMTR-MCNC: 94 MG/DL — SIGNIFICANT CHANGE UP (ref 70–99)
GP B STREP GENITAL QL CULT: SIGNIFICANT CHANGE UP

## 2019-06-01 PROCEDURE — 99234 HOSP IP/OBS SM DT SF/LOW 45: CPT

## 2019-06-01 RX ADMIN — Medication 40 UNIT(S): at 22:23

## 2019-06-01 RX ADMIN — Medication 14 UNIT(S): at 08:38

## 2019-06-01 RX ADMIN — HEPARIN SODIUM 5000 UNIT(S): 5000 INJECTION INTRAVENOUS; SUBCUTANEOUS at 18:36

## 2019-06-01 RX ADMIN — Medication 14 UNIT(S): at 17:44

## 2019-06-01 RX ADMIN — Medication 14 UNIT(S): at 12:39

## 2019-06-01 RX ADMIN — HEPARIN SODIUM 5000 UNIT(S): 5000 INJECTION INTRAVENOUS; SUBCUTANEOUS at 06:54

## 2019-06-01 NOTE — PROGRESS NOTE ADULT - ASSESSMENT
23yo  at 33w1d with chronic HTN, admitted with elevated BPs, IUGR (3%ile), oligohydramnios, and Category 2 tracing, now w/ superimposed preeclampsia (24hr urine 510). BPs normal range, no sx of preeclampsia, . Fetal tracing stable, reassuring.

## 2019-06-02 LAB
GLUCOSE BLDC GLUCOMTR-MCNC: 108 MG/DL — HIGH (ref 70–99)
GLUCOSE BLDC GLUCOMTR-MCNC: 67 MG/DL — LOW (ref 70–99)
GLUCOSE BLDC GLUCOMTR-MCNC: 75 MG/DL — SIGNIFICANT CHANGE UP (ref 70–99)
GLUCOSE BLDC GLUCOMTR-MCNC: 76 MG/DL — SIGNIFICANT CHANGE UP (ref 70–99)
GLUCOSE BLDC GLUCOMTR-MCNC: 80 MG/DL — SIGNIFICANT CHANGE UP (ref 70–99)
GLUCOSE BLDC GLUCOMTR-MCNC: 86 MG/DL — SIGNIFICANT CHANGE UP (ref 70–99)
GLUCOSE BLDC GLUCOMTR-MCNC: 92 MG/DL — SIGNIFICANT CHANGE UP (ref 70–99)

## 2019-06-02 PROCEDURE — 99234 HOSP IP/OBS SM DT SF/LOW 45: CPT

## 2019-06-02 RX ADMIN — Medication 14 UNIT(S): at 08:23

## 2019-06-02 RX ADMIN — HEPARIN SODIUM 5000 UNIT(S): 5000 INJECTION INTRAVENOUS; SUBCUTANEOUS at 06:20

## 2019-06-02 RX ADMIN — Medication 14 UNIT(S): at 17:52

## 2019-06-02 RX ADMIN — Medication 14 UNIT(S): at 12:24

## 2019-06-02 RX ADMIN — HEPARIN SODIUM 5000 UNIT(S): 5000 INJECTION INTRAVENOUS; SUBCUTANEOUS at 17:53

## 2019-06-02 RX ADMIN — Medication 40 UNIT(S): at 22:59

## 2019-06-02 NOTE — PROGRESS NOTE ADULT - ASSESSMENT
23yo  at 33w2d with chronic HTN, admitted with elevated BPs, IUGR (3%ile), oligohydramnios, and Category 2 tracing, now w/ superimposed preeclampsia (24hr urine 510). BPs normal range, no sx of preeclampsia, . Fetal tracing stable, reassuring.

## 2019-06-02 NOTE — PROGRESS NOTE ADULT - PROBLEM SELECTOR PROBLEM 3
Gestational diabetes
IUGR (intrauterine growth restriction) affecting care of mother
Gestational diabetes
Gestational diabetes

## 2019-06-02 NOTE — PROGRESS NOTE ADULT - PROBLEM SELECTOR PLAN 3
-c/w weight based insulin regimen  -continue to monitor FS  -c/w SCDs, HSQ for DVT ppx  -ADA reg diet    OJSY Alonso, PGY-3
EFW 1592g 3%ile, UA Dopplers 90%ile  - continuous monitoring.   f/u MFM     S Ewumi, R3
-c/w weight based insulin regimen  -continue to monitor FS  -c/w SCDs, HSQ for DVT ppx  -ADA reg diet    S Fred, R3
-c/w weight based insulin regimen  -continue to monitor FS  -c/w SCDs, HSQ for DVT ppx  -ADA reg diet    TZubkina, pgy3

## 2019-06-03 LAB
GLUCOSE BLDC GLUCOMTR-MCNC: 104 MG/DL — HIGH (ref 70–99)
GLUCOSE BLDC GLUCOMTR-MCNC: 74 MG/DL — SIGNIFICANT CHANGE UP (ref 70–99)
GLUCOSE BLDC GLUCOMTR-MCNC: 76 MG/DL — SIGNIFICANT CHANGE UP (ref 70–99)
GLUCOSE BLDC GLUCOMTR-MCNC: 77 MG/DL — SIGNIFICANT CHANGE UP (ref 70–99)
GLUCOSE BLDC GLUCOMTR-MCNC: 85 MG/DL — SIGNIFICANT CHANGE UP (ref 70–99)
GLUCOSE BLDC GLUCOMTR-MCNC: 87 MG/DL — SIGNIFICANT CHANGE UP (ref 70–99)
GLUCOSE BLDC GLUCOMTR-MCNC: 93 MG/DL — SIGNIFICANT CHANGE UP (ref 70–99)

## 2019-06-03 PROCEDURE — 99234 HOSP IP/OBS SM DT SF/LOW 45: CPT

## 2019-06-03 RX ADMIN — HEPARIN SODIUM 5000 UNIT(S): 5000 INJECTION INTRAVENOUS; SUBCUTANEOUS at 06:24

## 2019-06-03 RX ADMIN — HEPARIN SODIUM 5000 UNIT(S): 5000 INJECTION INTRAVENOUS; SUBCUTANEOUS at 18:21

## 2019-06-03 RX ADMIN — Medication 14 UNIT(S): at 08:16

## 2019-06-03 NOTE — PROGRESS NOTE ADULT - PROBLEM SELECTOR PROBLEM 2
Gestational diabetes
Preeclampsia complicating hypertension
Gestational diabetes

## 2019-06-03 NOTE — PROGRESS NOTE ADULT - ASSESSMENT
23yo  at 33w3d with chronic HTN, admitted with elevated BPs, IUGR (3%ile), oligohydramnios, and Category 2 tracing, now w/ superimposed preeclampsia (24hr urine 510). BPs normal range, no sx of preeclampsia, . Fetal tracing stable, reassuring.

## 2019-06-03 NOTE — PROGRESS NOTE ADULT - PROBLEM SELECTOR PLAN 2
- monitor FS  - rotating fluids protocol while NPO
-continue to monitor BPs
-A1, was placed on insulin for BMZ coverage, will d/c insulin regimen today  -continue to follow FS    d/w Dr. Fleischer TZubkina, pgy3

## 2019-06-04 ENCOUNTER — OUTPATIENT (OUTPATIENT)
Dept: OUTPATIENT SERVICES | Facility: HOSPITAL | Age: 24
LOS: 1 days | End: 2019-06-04

## 2019-06-04 ENCOUNTER — APPOINTMENT (OUTPATIENT)
Dept: ANTEPARTUM | Facility: HOSPITAL | Age: 24
End: 2019-06-04
Payer: COMMERCIAL

## 2019-06-04 ENCOUNTER — ASOB RESULT (OUTPATIENT)
Age: 24
End: 2019-06-04

## 2019-06-04 LAB
BLD GP AB SCN SERPL QL: NEGATIVE — SIGNIFICANT CHANGE UP
GLUCOSE BLDC GLUCOMTR-MCNC: 76 MG/DL — SIGNIFICANT CHANGE UP (ref 70–99)
GLUCOSE BLDC GLUCOMTR-MCNC: 86 MG/DL — SIGNIFICANT CHANGE UP (ref 70–99)
GLUCOSE BLDC GLUCOMTR-MCNC: 86 MG/DL — SIGNIFICANT CHANGE UP (ref 70–99)
GLUCOSE BLDC GLUCOMTR-MCNC: 92 MG/DL — SIGNIFICANT CHANGE UP (ref 70–99)
GLUCOSE BLDC GLUCOMTR-MCNC: 92 MG/DL — SIGNIFICANT CHANGE UP (ref 70–99)
RH IG SCN BLD-IMP: POSITIVE — SIGNIFICANT CHANGE UP

## 2019-06-04 PROCEDURE — 76819 FETAL BIOPHYS PROFIL W/O NST: CPT | Mod: 26

## 2019-06-04 PROCEDURE — 99231 SBSQ HOSP IP/OBS SF/LOW 25: CPT | Mod: 25

## 2019-06-04 PROCEDURE — 76820 UMBILICAL ARTERY ECHO: CPT | Mod: 26

## 2019-06-04 PROCEDURE — 99234 HOSP IP/OBS SM DT SF/LOW 45: CPT

## 2019-06-04 RX ADMIN — HEPARIN SODIUM 5000 UNIT(S): 5000 INJECTION INTRAVENOUS; SUBCUTANEOUS at 18:03

## 2019-06-04 RX ADMIN — HEPARIN SODIUM 5000 UNIT(S): 5000 INJECTION INTRAVENOUS; SUBCUTANEOUS at 06:06

## 2019-06-04 NOTE — PROGRESS NOTE ADULT - ASSESSMENT
25yo  at 33w4d with chronic HTN, admitted with elevated BPs, IUGR (3%ile), oligohydramnios, and Category 2 tracing, now w/ superimposed preeclampsia (24hr urine 510). BPs normal range, pt asymptomatic. Fetal tracing stable, reassuring.

## 2019-06-05 LAB
GLUCOSE BLDC GLUCOMTR-MCNC: 111 MG/DL — HIGH (ref 70–99)
GLUCOSE BLDC GLUCOMTR-MCNC: 116 MG/DL — HIGH (ref 70–99)
GLUCOSE BLDC GLUCOMTR-MCNC: 80 MG/DL — SIGNIFICANT CHANGE UP (ref 70–99)
GLUCOSE BLDC GLUCOMTR-MCNC: 95 MG/DL — SIGNIFICANT CHANGE UP (ref 70–99)

## 2019-06-05 PROCEDURE — 99234 HOSP IP/OBS SM DT SF/LOW 45: CPT

## 2019-06-05 RX ADMIN — HEPARIN SODIUM 5000 UNIT(S): 5000 INJECTION INTRAVENOUS; SUBCUTANEOUS at 18:25

## 2019-06-05 RX ADMIN — HEPARIN SODIUM 5000 UNIT(S): 5000 INJECTION INTRAVENOUS; SUBCUTANEOUS at 06:42

## 2019-06-05 NOTE — PROGRESS NOTE ADULT - ASSESSMENT
23yo  at 33w5d with chronic HTN, admitted with elevated BPs, IUGR (3%ile), oligohydramnios, and Category 2 tracing, now w/ superimposed preeclampsia (24hr urine 510). BPs normal range though several severe range BPs noted several days ago, pt asymptomatic. Fetal tracing stable.

## 2019-06-06 ENCOUNTER — ASOB RESULT (OUTPATIENT)
Age: 24
End: 2019-06-06

## 2019-06-06 ENCOUNTER — OUTPATIENT (OUTPATIENT)
Dept: OUTPATIENT SERVICES | Facility: HOSPITAL | Age: 24
LOS: 1 days | End: 2019-06-06

## 2019-06-06 ENCOUNTER — APPOINTMENT (OUTPATIENT)
Dept: ANTEPARTUM | Facility: HOSPITAL | Age: 24
End: 2019-06-06

## 2019-06-06 LAB
GLUCOSE BLDC GLUCOMTR-MCNC: 108 MG/DL — HIGH (ref 70–99)
GLUCOSE BLDC GLUCOMTR-MCNC: 115 MG/DL — HIGH (ref 70–99)
GLUCOSE BLDC GLUCOMTR-MCNC: 73 MG/DL — SIGNIFICANT CHANGE UP (ref 70–99)
GLUCOSE BLDC GLUCOMTR-MCNC: 80 MG/DL — SIGNIFICANT CHANGE UP (ref 70–99)
GLUCOSE BLDC GLUCOMTR-MCNC: 93 MG/DL — SIGNIFICANT CHANGE UP (ref 70–99)

## 2019-06-06 PROCEDURE — 99234 HOSP IP/OBS SM DT SF/LOW 45: CPT

## 2019-06-06 RX ADMIN — HEPARIN SODIUM 5000 UNIT(S): 5000 INJECTION INTRAVENOUS; SUBCUTANEOUS at 22:17

## 2019-06-06 RX ADMIN — HEPARIN SODIUM 5000 UNIT(S): 5000 INJECTION INTRAVENOUS; SUBCUTANEOUS at 05:57

## 2019-06-06 NOTE — PROGRESS NOTE ADULT - ASSESSMENT
25yo  at 33w6d with chronic HTN, admitted with elevated BPs, IUGR (3%ile), oligohydramnios, and Category 2 tracing, now w/ superimposed preeclampsia (24hr urine 510). BPs normal range though several severe range BPs noted several days ago, pt asymptomatic.

## 2019-06-07 VITALS
RESPIRATION RATE: 18 BRPM | OXYGEN SATURATION: 98 % | TEMPERATURE: 98 F | SYSTOLIC BLOOD PRESSURE: 138 MMHG | DIASTOLIC BLOOD PRESSURE: 81 MMHG | HEART RATE: 85 BPM

## 2019-06-07 DIAGNOSIS — O36.5930 MATERNAL CARE FOR OTHER KNOWN OR SUSPECTED POOR FETAL GROWTH, THIRD TRIMESTER, NOT APPLICABLE OR UNSPECIFIED: ICD-10-CM

## 2019-06-07 DIAGNOSIS — O41.03X0 OLIGOHYDRAMNIOS, THIRD TRIMESTER, NOT APPLICABLE OR UNSPECIFIED: ICD-10-CM

## 2019-06-07 DIAGNOSIS — O24.419 GESTATIONAL DIABETES MELLITUS IN PREGNANCY, UNSPECIFIED CONTROL: ICD-10-CM

## 2019-06-07 LAB
GLUCOSE BLDC GLUCOMTR-MCNC: 111 MG/DL — HIGH (ref 70–99)
GLUCOSE BLDC GLUCOMTR-MCNC: 74 MG/DL — SIGNIFICANT CHANGE UP (ref 70–99)

## 2019-06-07 PROCEDURE — 99233 SBSQ HOSP IP/OBS HIGH 50: CPT | Mod: GC

## 2019-06-07 PROCEDURE — 99238 HOSP IP/OBS DSCHRG MGMT 30/<: CPT

## 2019-06-07 NOTE — PROGRESS NOTE ADULT - PROBLEM SELECTOR PLAN 1
- complete betamethasone for fetal lung maturity  - continue EFM/toco. Pt to be evaluated this am, likely intermittent monitoring if remains reassuring  - BP monitoring  - continue 24hr urine collection  - monitor for sx of PEC  - f/u NICU consult
- s/p betamethasone for fetal lung maturity  - NST BID x 2hrs  - BP monitoring  - 24 hr urine 510  - monitor for sx of PEC  - f/u NICU consult
- s/p betamethasone for fetal lung maturity  - NST BID x 2hrs  - BP monitoring  - 24 hr urine 510  - monitor for sx of PEC  - f/u NICU consult  -A1, continue to monitor FS  -delivery pending on this time, dependent of further BPs, NSTs, dopplers    Ta, pgy3
- s/p betamethasone for fetal lung maturity  - NST BID x 2hrs  - BP monitoring  - 24 hr urine 510  - monitor for sx of PEC  - f/u NICU consult
- s/p betamethasone for fetal lung maturity  - NST BID x 2hrs  - BP monitoring  - 24 hr urine 510  - monitor for sx of PEC  - f/u NICU consult
- s/p betamethasone for fetal lung maturity  - NST BID x 2hrs  - BP monitoring  - 24 hr urine 510  - monitor for sx of PEC  - f/u NICU consult  -A1, continue to monitor FS  -delivery pending at this time, dependent of further BPs, NSTs, dopplers, maternal wellbeing    Ta, pgy3
- s/p betamethasone for fetal lung maturity  - NST BID x 2hrs  - BP monitoring  - 24 hr urine 510  - monitor for sx of PEC  - f/u NICU consult  -A1, continue to monitor FS  -delivery pending at this time, dependent of further BPs, NSTs, dopplers, maternal wellbeing  -dispo: home today pending BPs and NST with close outpt follow up    Ta, pgy3
Repeat BPP today noted to have appropriate SALVADOR, elevated Dopplers. BPP 8/8, NST noted to be non- reactive    Patient aware that NST will be rechecked this evening. If FHT is Cat I, and BP have remained within range, may consider discharge with follow up in the ATU twice weekly and with her obgyn once weekly. If patient discharged home, counseled to come to L&D triage on Sat/Sunday for NST/BPP and BP check.     If BP becomes severe range, or NST non-reactive, would recommend that patient remain inpatient currently.     Current recommendations are for delivery at 37 weeks, however patient aware recommendations may change depending on maternal/fetal status    Seen with Dr. Dunia Norman M Fellow
- s/p betamethasone for fetal lung maturity  - NST BID x 2hrs  - BP monitoring  - 24 hr urine 510  - monitor for sx of PEC  - f/u NICU consult
- s/p betamethasone for fetal lung maturity  - NST BID x 2hrs  - BP monitoring  - 24 hr urine 510  - monitor for sx of PEC  - f/u NICU consult  -A1, continue to monitor FS  -delivery pending at this time, dependent of further BPs, NSTs, dopplers, maternal wellbeing    Ta, pgy3

## 2019-06-07 NOTE — PROGRESS NOTE ADULT - PROBLEM SELECTOR PROBLEM 1
Chronic hypertension
IUGR (intrauterine growth restriction) affecting care of mother
Preeclampsia complicating hypertension
IUGR (intrauterine growth restriction) affecting care of mother
Preeclampsia complicating hypertension
IUGR (intrauterine growth restriction) affecting care of mother

## 2019-06-07 NOTE — PROGRESS NOTE ADULT - SUBJECTIVE AND OBJECTIVE BOX
Antepartum Progress Note  HD#2    Patient evaluated at bedside. She feels relatively uncomfortable with the external monitors, otherwise no complaints. Pt denies fevers, chest pain, SOB, blurry vision, headaches, lightheadedness or dizziness. She endorses active fetal movements. No contractions, LOF, or vaginal bleeding.    Exam  T(C): 36.6 (05-29 @ 23:18), Max: 36.8 (05-29 @ 19:13)  HR: 78 (05-30 @ 06:05) (70 - 94)  BP: 147/67 (05-30 @ 05:50) (108/54 - 161/71)    Gen: No acute distress  CV: Regular rate and rhythm  Pulm: Clear to auscultation bilaterally  Abd: gravid, no RUQ or epigastric tenderness, no rebound or guarding  Ext: no calf tenderness bilaterally    EFM 135bpm, moderate variability, +accels, no recurrent decels, intermittent variable decelerations  [place] rare contractions    05-29-19 @ 07:01  -  05-30-19 @ 07:00  --------------------------------------------------------  IN: 1500 mL / OUT: 800 mL / NET: 700 mL      CAPILLARY BLOOD GLUCOSE  POCT Blood Glucose.: 96 mg/dL (30 May 2019 04:49)  POCT Blood Glucose.: 107 mg/dL (29 May 2019 23:05)  POCT Blood Glucose.: 78 mg/dL (29 May 2019 16:48)        betamethasone Injectable 12 milliGRAM(s) IntraMuscular every 24 hours  dextrose 5% + lactated ringers. 1000 milliLiter(s) IV Continuous <Continuous>  lactated ringers. 1000 milliLiter(s) IV Continuous <Continuous>
R3 Antepartum Note, HD#5    Patient seen and examined at bedside, no acute overnight events. No acute complaints. Pt reports +FM, denies LOF, VB, ctx, HA, epigastric pain, blurred vision, CP, SOB, N/V, fevers, and chills.    Vital Signs Last 24 Hours  T(C): 36.7 (06-01-19 @ 22:37), Max: 37 (06-01-19 @ 18:18)  HR: 75 (06-01-19 @ 22:37) (75 - 92)  BP: 128/69 (06-01-19 @ 22:37) (117/56 - 145/76)  RR: 18 (06-01-19 @ 22:37) (18 - 19)  SpO2: 99% (06-01-19 @ 22:37) (98% - 99%)    CAPILLARY BLOOD GLUCOSE  POCT Blood Glucose.: 73 mg/dL (01 Jun 2019 22:06)  POCT Blood Glucose.: 90 mg/dL (01 Jun 2019 19:30)  POCT Blood Glucose.: 84 mg/dL (01 Jun 2019 17:25)  POCT Blood Glucose.: 86 mg/dL (01 Jun 2019 14:22)  POCT Blood Glucose.: 73 mg/dL (01 Jun 2019 12:19)  POCT Blood Glucose.: 94 mg/dL (01 Jun 2019 10:35)  POCT Blood Glucose.: 74 mg/dL (01 Jun 2019 08:31)    Physical Exam:  General: NAD  Abdomen: Soft, non-tender, gravid  Ext: No pain or swelling    NST reactive overnight    MEDICATIONS  (STANDING):  dextrose 5%. 1000 milliLiter(s) (50 mL/Hr) IV Continuous <Continuous>  dextrose 50% Injectable 12.5 Gram(s) IV Push once  dextrose 50% Injectable 25 Gram(s) IV Push once  dextrose 50% Injectable 25 Gram(s) IV Push once  heparin  Injectable 5000 Unit(s) SubCutaneous every 12 hours  insulin detemir injectable (LEVEMIR) 40 Unit(s) SubCutaneous at bedtime  insulin lispro Injectable (HumaLOG) 14 Unit(s) SubCutaneous three times a day before meals    MEDICATIONS  (PRN):  dextrose 40% Gel 15 Gram(s) Oral once PRN Blood Glucose LESS THAN 70 milliGRAM(s)/deciliter  glucagon  Injectable 1 milliGRAM(s) IntraMuscular once PRN Glucose LESS THAN 70 milligrams/deciliter
R3 Antepartum Note, HD#7    Subjective:   Pt seen and examined at bedside. No events overnight. Pt denies LOF, VB, ctx. +FM. Denies HA, vision changes, RUQ pain. Pt denies fever, chills, chest pain, SOB, nausea, vomiting, lightheadedness, dizziness.      Objective:  T(F): 97.7 (06-04-19 @ 14:16), Max: 98.6 (06-03-19 @ 18:14)  HR: 79 (06-04-19 @ 14:16) (71 - 83)  BP: 126/71 (06-04-19 @ 14:16) (126/71 - 145/72)  RR: 18 (06-04-19 @ 14:16) (18 - 20)  SpO2: 98% (06-04-19 @ 14:16) (94% - 99%)  Wt(kg): --  I&O's Summary    CAPILLARY BLOOD GLUCOSE      POCT Blood Glucose.: 92 mg/dL (04 Jun 2019 14:13)  POCT Blood Glucose.: 92 mg/dL (04 Jun 2019 10:10)  POCT Blood Glucose.: 76 mg/dL (04 Jun 2019 08:08)  POCT Blood Glucose.: 85 mg/dL (03 Jun 2019 22:36)  POCT Blood Glucose.: 93 mg/dL (03 Jun 2019 18:54)  POCT Blood Glucose.: 76 mg/dL (03 Jun 2019 17:19)      MEDICATIONS  (STANDING):  heparin  Injectable 5000 Unit(s) SubCutaneous every 12 hours    MEDICATIONS  (PRN):      Physical Exam:  Constitutional: NAD, A+O x3  CV: RRR  Lungs: clear to auscultation bilaterally  Abdomen: soft, nondistended, no guarding, no rebound  Extremities: no lower extremity edema or calf tenderness bilaterally
MFM Fellow    Patient seen at bedside.   No acute complaints. Denies any HA, visual changes, CP/SOB, RUQ pain, N/V. Tolerating diet. OOB as tolerated. Voiding freely    Vital Signs Last 24 Hrs  T(C): 36.7 (06 Jun 2019 14:52), Max: 36.9 (05 Jun 2019 17:58)  T(F): 98.1 (06 Jun 2019 14:52), Max: 98.4 (05 Jun 2019 17:58)  HR: 77 (06 Jun 2019 14:52) (65 - 94)  BP: 128/61 (06 Jun 2019 14:52) (126/61 - 155/72)  BP(mean): --  RR: 18 (06 Jun 2019 14:52) (17 - 19)  SpO2: 99% (06 Jun 2019 14:52) (97% - 100%)    CAPILLARY BLOOD GLUCOSE      POCT Blood Glucose.: 93 mg/dL (06 Jun 2019 14:15)  POCT Blood Glucose.: 115 mg/dL (06 Jun 2019 10:13)  POCT Blood Glucose.: 73 mg/dL (06 Jun 2019 08:07)  POCT Blood Glucose.: 95 mg/dL (05 Jun 2019 22:02)  POCT Blood Glucose.: 80 mg/dL (05 Jun 2019 18:39)        , mod nick, 10x10 accels, - decels  TOCO none
Pt seen and evaluated very late 6/6/19 night. She had been seen previously by Dr Pickering with plan for possible discharge home if NST was without any variable decelerations. NST done at approximately 6 pm was reviewed by me at that time with no plan for discharge home on 6/6/19. The reasoning and rationale behind continued hospitalization last night was discussed with the pt. She and her family and friends had all of their questions answered to their apparent satisfaction.
R3 Antepartum Note, HD#10    Subjective:   Pt seen and evaluated at bedside. She denies any HA, visual changes, RUQ pain. She denies any SOB, CP, n/v, fever/chills. She denies any LOF, VB, ctx. +FM.     Objective:  T(F): 97.9 (06-07-19 @ 10:04), Max: 98.5 (06-07-19 @ 01:55)  HR: 82 (06-07-19 @ 10:04) (72 - 85)  BP: 138/81 (06-07-19 @ 10:04) (124/70 - 158/84)  RR: 18 (06-07-19 @ 10:04) (17 - 18)  SpO2: 98% (06-07-19 @ 10:04) (98% - 100%)    POCT Blood Glucose.: 111 mg/dL (07 Jun 2019 09:33)  POCT Blood Glucose.: 74 mg/dL (07 Jun 2019 08:06)  POCT Blood Glucose.: 80 mg/dL (06 Jun 2019 21:52)  POCT Blood Glucose.: 108 mg/dL (06 Jun 2019 18:12)  POCT Blood Glucose.: 93 mg/dL (06 Jun 2019 14:15)      MEDICATIONS  (STANDING):  heparin  Injectable 5000 Unit(s) SubCutaneous every 12 hours    MEDICATIONS  (PRN):      Physical Exam:  Constitutional: NAD, A+O x3  CV: RRR  Lungs: clear to auscultation bilaterally  Abdomen: soft, nondistended, no guarding, no rebound  Extremities: no lower extremity edema or calf tenderness bilaterally
R3 Antepartum Note, HD#3    Subjective:   Pt seen and examined at bedside. Pt denies LOF, VB, ctx. +FM. She denies HA, vision changes, RUQ pain. Tolerating regular diet. Pt denies fever, chills, chest pain, SOB, nausea, vomiting, lightheadedness, dizziness.      Objective:  T(F): 98.3 (19 @ 10:06), Max: 98.7 (19 @ 22:09)  HR: 84 (19 @ 10:06) (73 - 96)  BP: 133/66 (19 @ 10:06) (107/55 - 161/74)  RR: 22 (19 @ 10:06) (16 - 22)  SpO2: 98% (19 @ 10:06) (96% - 99%)    I&O's Summary    30 May 2019 07:01  -  31 May 2019 07:00  --------------------------------------------------------  IN: 625 mL / OUT: 0 mL / NET: 625 mL      POCT Blood Glucose.: 88 mg/dL (31 May 2019 12:11)  POCT Blood Glucose.: 123 mg/dL (31 May 2019 09:49)  POCT Blood Glucose.: 110 mg/dL (31 May 2019 07:55)  POCT Blood Glucose.: 100 mg/dL (30 May 2019 22:11)  POCT Blood Glucose.: 108 mg/dL (30 May 2019 18:44)  POCT Blood Glucose.: 85 mg/dL (30 May 2019 17:17)      MEDICATIONS  (STANDING):  dextrose 5%. 1000 milliLiter(s) (50 mL/Hr) IV Continuous <Continuous>  dextrose 50% Injectable 12.5 Gram(s) IV Push once  dextrose 50% Injectable 25 Gram(s) IV Push once  dextrose 50% Injectable 25 Gram(s) IV Push once  insulin detemir injectable (LEVEMIR) 40 Unit(s) SubCutaneous at bedtime  insulin lispro Injectable (HumaLOG) 14 Unit(s) SubCutaneous three times a day before meals    MEDICATIONS  (PRN):  dextrose 40% Gel 15 Gram(s) Oral once PRN Blood Glucose LESS THAN 70 milliGRAM(s)/deciliter  glucagon  Injectable 1 milliGRAM(s) IntraMuscular once PRN Glucose LESS THAN 70 milligrams/deciliter      Physical Exam:  Constitutional: NAD, A+O x3  Abdomen: soft, nondistended, no guarding, no rebound  Extremities: no lower extremity edema or calf tenderness bilaterally    LABS:      PT/INR - ( 29 May 2019 16:50 )   PT: 10.2 SEC;   INR: 0.90          PTT - ( 29 May 2019 16:50 )  PTT:34.1 SEC  Urinalysis Basic - ( 29 May 2019 17:22 )    Color: LIGHT YELLOW / Appearance: CLEAR / S.015 / pH: 7.0  Gluc: NEGATIVE / Ketone: NEGATIVE  / Bili: NEGATIVE / Urobili: NORMAL   Blood: NEGATIVE / Protein: NEGATIVE / Nitrite: NEGATIVE   Leuk Esterase: MODERATE / RBC: 0-2 / WBC 6-10   Sq Epi: FEW / Non Sq Epi: x / Bacteria: FEW
R3 Antepartum Note, HD#4    Subjective:   Pt seen and examined at bedside. No acute events overnight. Pt denies LOF, VB, ctx. +FM. She denies HA, vision changes, RUQ pain. Tolerating regular diet. Pt denies fever, chills, chest pain, SOB, nausea, vomiting, lightheadedness, dizziness.      Objective:  Vital Signs Last 24 Hrs  T(C): 37 (01 Jun 2019 01:46), Max: 37.1 (31 May 2019 18:45)  T(F): 98.6 (01 Jun 2019 01:46), Max: 98.7 (31 May 2019 18:45)  HR: 72 (01 Jun 2019 05:03) (68 - 99)  BP: 132/63 (01 Jun 2019 05:03) (115/59 - 152/75)  RR: 18 (01 Jun 2019 01:46) (16 - 24)  SpO2: 96% (01 Jun 2019 05:02) (96% - 100%)    Physical Exam:  Constitutional: NAD, A+O x3  Abdomen: soft, nondistended, no guarding, no rebound  Extremities: no lower extremity edema or calf tenderness bilaterally    130bpm, min to mod variability, +Accels, no decels    I&O's Summary    30 May 2019 07:01  -  31 May 2019 07:00  --------------------------------------------------------  IN: 625 mL / OUT: 0 mL / NET: 625 mL      CAPILLARY BLOOD GLUCOSE  POCT Blood Glucose.: 79 mg/dL (31 May 2019 22:28)  POCT Blood Glucose.: 85 mg/dL (31 May 2019 19:06)  POCT Blood Glucose.: 100 mg/dL (31 May 2019 17:03)  POCT Blood Glucose.: 103 mg/dL (31 May 2019 13:54)  POCT Blood Glucose.: 88 mg/dL (31 May 2019 12:11)  POCT Blood Glucose.: 123 mg/dL (31 May 2019 09:49)  POCT Blood Glucose.: 110 mg/dL (31 May 2019 07:55)    LABS:      PT/INR - ( 29 May 2019 16:50 )   PT: 10.2 SEC;   INR: 0.90          PTT - ( 29 May 2019 16:50 )  PTT:34.1 SEC
R3 Antepartum Note, HD#6    Subjective:   Pt seen and examined at bedside. Pt denies any HA, vision changes, RUQ pain. She denies LOF, VB, ctx. +FM. Pt denies fever, chills, chest pain, SOB, nausea, vomiting, lightheadedness, dizziness.      Objective:  T(F): 98.2 (06-03-19 @ 06:04), Max: 98.2 (06-02-19 @ 22:53)  HR: 64 (06-03-19 @ 06:04) (64 - 82)  BP: 128/74 (06-03-19 @ 06:04) (114/59 - 154/73)  RR: 17 (06-02-19 @ 22:53) (17 - 18)  SpO2: 99% (06-03-19 @ 06:04) (96% - 100%)    CAPILLARY BLOOD GLUCOSE      POCT Blood Glucose.: 74 mg/dL (03 Jun 2019 08:04)  POCT Blood Glucose.: 108 mg/dL (02 Jun 2019 22:19)  POCT Blood Glucose.: 92 mg/dL (02 Jun 2019 19:28)  POCT Blood Glucose.: 76 mg/dL (02 Jun 2019 17:26)  POCT Blood Glucose.: 86 mg/dL (02 Jun 2019 13:52)  POCT Blood Glucose.: 75 mg/dL (02 Jun 2019 12:09)  POCT Blood Glucose.: 80 mg/dL (02 Jun 2019 09:50)      MEDICATIONS  (STANDING):  dextrose 5%. 1000 milliLiter(s) (50 mL/Hr) IV Continuous <Continuous>  dextrose 50% Injectable 12.5 Gram(s) IV Push once  dextrose 50% Injectable 25 Gram(s) IV Push once  dextrose 50% Injectable 25 Gram(s) IV Push once  heparin  Injectable 5000 Unit(s) SubCutaneous every 12 hours  insulin detemir injectable (LEVEMIR) 40 Unit(s) SubCutaneous at bedtime  insulin lispro Injectable (HumaLOG) 14 Unit(s) SubCutaneous three times a day before meals    MEDICATIONS  (PRN):  dextrose 40% Gel 15 Gram(s) Oral once PRN Blood Glucose LESS THAN 70 milliGRAM(s)/deciliter  glucagon  Injectable 1 milliGRAM(s) IntraMuscular once PRN Glucose LESS THAN 70 milligrams/deciliter      Physical Exam:  Constitutional: NAD, A+O x3  CV: RRR  Lungs: clear to auscultation bilaterally  Abdomen: soft, nondistended, no guarding, no rebound  Extremities: no lower extremity edema or calf tenderness bilaterally
R3 Antepartum Note, HD#8    Subjective:   Pt seen and evaluated at bedside. She denies any HA, visual changes, RUQ pain. She denies any SOB, CP, n/v, fever/chills. She denies any LOF, VB, ctx. +FM.    Objective:  T(F): 97.6 (06-05-19 @ 09:56), Max: 98.2 (06-05-19 @ 01:47)  HR: 75 (06-05-19 @ 09:56) (68 - 96)  BP: 127/61 (06-05-19 @ 09:56) (118/61 - 140/66)  RR: 20 (06-05-19 @ 09:56) (17 - 20)  SpO2: 99% (06-05-19 @ 09:56) (98% - 100%)    I&O's Summary    CAPILLARY BLOOD GLUCOSE      POCT Blood Glucose.: 111 mg/dL (05 Jun 2019 09:27)  POCT Blood Glucose.: 86 mg/dL (04 Jun 2019 22:07)  POCT Blood Glucose.: 86 mg/dL (04 Jun 2019 19:00)  POCT Blood Glucose.: 92 mg/dL (04 Jun 2019 14:13)      MEDICATIONS  (STANDING):  heparin  Injectable 5000 Unit(s) SubCutaneous every 12 hours    MEDICATIONS  (PRN):      Physical Exam:  Constitutional: NAD, A+O x3  CV: RRR  Lungs: clear to auscultation bilaterally  Abdomen: soft, nondistended, no guarding, no rebound  Extremities: no lower extremity edema or calf tenderness bilaterally
R3 Antepartum Note, HD#9 - Timeback to 8am    Subjective:   Pt seen and examined at bedside. No events overnight. Pt denies HA, vision changes, RUQ pain. Pt denies LFO, VB, ctx. +FM. Patient ambulating. Passing flatus. Tolerating regular diet. Pt denies fever, chills, chest pain, SOB, nausea, vomiting, lightheadedness, dizziness.      Objective:  T(F): 98.3 (06-06-19 @ 10:16), Max: 98.7 (06-05-19 @ 13:54)  HR: 94 (06-06-19 @ 10:16) (65 - 94)  BP: 129/68 (06-06-19 @ 10:16) (126/61 - 155/72)  RR: 17 (06-06-19 @ 10:16) (17 - 20)  SpO2: 98% (06-06-19 @ 10:16) (97% - 100%)  Wt(kg): --  I&O's Summary    05 Jun 2019 07:01  -  06 Jun 2019 07:00  --------------------------------------------------------  IN: 0 mL / OUT: 1300 mL / NET: -1300 mL      CAPILLARY BLOOD GLUCOSE      POCT Blood Glucose.: 115 mg/dL (06 Jun 2019 10:13)  POCT Blood Glucose.: 73 mg/dL (06 Jun 2019 08:07)  POCT Blood Glucose.: 95 mg/dL (05 Jun 2019 22:02)  POCT Blood Glucose.: 80 mg/dL (05 Jun 2019 18:39)  POCT Blood Glucose.: 116 mg/dL (05 Jun 2019 13:30)      MEDICATIONS  (STANDING):  heparin  Injectable 5000 Unit(s) SubCutaneous every 12 hours    MEDICATIONS  (PRN):      Physical Exam:  Constitutional: NAD, A+O x3  CV: RRR  Lungs: clear to auscultation bilaterally  Abdomen: soft, nondistended, no guarding, no rebound, normal bowel sounds  Extremities: no lower extremity edema or calf tenderness bilaterally

## 2019-06-07 NOTE — PROGRESS NOTE ADULT - ATTENDING COMMENTS
Pt seen and evaluated by me.  Offers no c/o.  Feels well.  Desires D/c home as outlined by MFM.  I discussed concern for superimposed preeclamspia and IUGR fetus.  SALVADOR now normal.  Will f/up Rpt NST this evening and final decision to be made.
Pt seen and examined by me.  Agree with above assessment and plan  FHT currently being monitored  Vital Signs Last 24 Hrs  T(C): 36.6 (07 Jun 2019 10:04), Max: 36.9 (07 Jun 2019 01:55)  T(F): 97.9 (07 Jun 2019 10:04), Max: 98.5 (07 Jun 2019 01:55)  HR: 82 (07 Jun 2019 10:04) (72 - 85)  BP: 138/81 (07 Jun 2019 10:04) (124/70 - 158/84)  BP(mean): --  RR: 18 (07 Jun 2019 10:04) (17 - 18)  SpO2: 98% (07 Jun 2019 10:04) (98% - 100%)  Possible d/c home today
patient seen and examined at bedside. agree with above assessment and plan
saw and examined patient and agree with above. BP is stable. FHt has periods of dec variability but with accel 10-15 BPM accel
Pt seen and examined by me today. I agree with findings, assessment and plan documented in resident note.
Patient seen and assessed by me. Agree with resident assessment and plan. Pre-eclampsia at 33 weeks. No severe features, but will continue to monitor for now.
Pt seen by me this evening after transfer to antepartum floor. All of her questions were answered to her apparent satisfaction. I agree with assessment and plan in senior resident note written earlier today. Second dose of steroids administered. 24 hour urine collection completed.

## 2019-06-07 NOTE — PROGRESS NOTE ADULT - ASSESSMENT
23yo  at 34wks with chronic HTN, admitted with elevated BPs, IUGR (3%ile), oligohydramnios, and Category 2 tracing, now w/ superimposed preeclampsia (24hr urine 510). BPs normal range though several severe range BPs noted several days ago, pt asymptomatic.

## 2019-06-11 ENCOUNTER — ASOB RESULT (OUTPATIENT)
Age: 24
End: 2019-06-11

## 2019-06-11 ENCOUNTER — APPOINTMENT (OUTPATIENT)
Dept: ANTEPARTUM | Facility: CLINIC | Age: 24
End: 2019-06-11
Payer: COMMERCIAL

## 2019-06-11 ENCOUNTER — OUTPATIENT (OUTPATIENT)
Dept: OUTPATIENT SERVICES | Facility: HOSPITAL | Age: 24
LOS: 1 days | End: 2019-06-11

## 2019-06-11 ENCOUNTER — APPOINTMENT (OUTPATIENT)
Dept: ANTEPARTUM | Facility: HOSPITAL | Age: 24
End: 2019-06-11

## 2019-06-11 DIAGNOSIS — O36.5930 MATERNAL CARE FOR OTHER KNOWN OR SUSPECTED POOR FETAL GROWTH, THIRD TRIMESTER, NOT APPLICABLE OR UNSPECIFIED: ICD-10-CM

## 2019-06-11 DIAGNOSIS — O41.03X0 OLIGOHYDRAMNIOS, THIRD TRIMESTER, NOT APPLICABLE OR UNSPECIFIED: ICD-10-CM

## 2019-06-11 DIAGNOSIS — O24.419 GESTATIONAL DIABETES MELLITUS IN PREGNANCY, UNSPECIFIED CONTROL: ICD-10-CM

## 2019-06-11 PROCEDURE — 76821 MIDDLE CEREBRAL ARTERY ECHO: CPT

## 2019-06-11 PROCEDURE — 76820 UMBILICAL ARTERY ECHO: CPT

## 2019-06-11 PROCEDURE — 76816 OB US FOLLOW-UP PER FETUS: CPT

## 2019-06-11 PROCEDURE — 76818 FETAL BIOPHYS PROFILE W/NST: CPT | Mod: 26

## 2019-06-13 ENCOUNTER — APPOINTMENT (OUTPATIENT)
Dept: OBGYN | Facility: CLINIC | Age: 24
End: 2019-06-13
Payer: COMMERCIAL

## 2019-06-13 ENCOUNTER — LABORATORY RESULT (OUTPATIENT)
Age: 24
End: 2019-06-13

## 2019-06-13 ENCOUNTER — NON-APPOINTMENT (OUTPATIENT)
Age: 24
End: 2019-06-13

## 2019-06-13 VITALS
WEIGHT: 249 LBS | SYSTOLIC BLOOD PRESSURE: 153 MMHG | BODY MASS INDEX: 41.48 KG/M2 | HEIGHT: 65 IN | DIASTOLIC BLOOD PRESSURE: 95 MMHG

## 2019-06-13 VITALS — DIASTOLIC BLOOD PRESSURE: 94 MMHG | SYSTOLIC BLOOD PRESSURE: 144 MMHG

## 2019-06-13 PROCEDURE — 0502F SUBSEQUENT PRENATAL CARE: CPT

## 2019-06-14 ENCOUNTER — OUTPATIENT (OUTPATIENT)
Dept: OUTPATIENT SERVICES | Facility: HOSPITAL | Age: 24
LOS: 1 days | End: 2019-06-14

## 2019-06-14 ENCOUNTER — ASOB RESULT (OUTPATIENT)
Age: 24
End: 2019-06-14

## 2019-06-14 ENCOUNTER — APPOINTMENT (OUTPATIENT)
Dept: ANTEPARTUM | Facility: CLINIC | Age: 24
End: 2019-06-14
Payer: COMMERCIAL

## 2019-06-14 ENCOUNTER — INPATIENT (INPATIENT)
Facility: HOSPITAL | Age: 24
LOS: 2 days | Discharge: HOME | End: 2019-06-17
Attending: OBSTETRICS & GYNECOLOGY | Admitting: OBSTETRICS & GYNECOLOGY
Payer: COMMERCIAL

## 2019-06-14 VITALS — HEART RATE: 76 BPM | SYSTOLIC BLOOD PRESSURE: 131 MMHG | DIASTOLIC BLOOD PRESSURE: 76 MMHG

## 2019-06-14 DIAGNOSIS — O41.93X0 DISORDER OF AMNIOTIC FLUID AND MEMBRANES, UNSPECIFIED, THIRD TRIMESTER, NOT APPLICABLE OR UNSPECIFIED: ICD-10-CM

## 2019-06-14 DIAGNOSIS — Z3A.00 WEEKS OF GESTATION OF PREGNANCY NOT SPECIFIED: ICD-10-CM

## 2019-06-14 DIAGNOSIS — O36.5930 MATERNAL CARE FOR OTHER KNOWN OR SUSPECTED POOR FETAL GROWTH, THIRD TRIMESTER, NOT APPLICABLE OR UNSPECIFIED: ICD-10-CM

## 2019-06-14 DIAGNOSIS — O24.419 GESTATIONAL DIABETES MELLITUS IN PREGNANCY, UNSPECIFIED CONTROL: ICD-10-CM

## 2019-06-14 DIAGNOSIS — O26.899 OTHER SPECIFIED PREGNANCY RELATED CONDITIONS, UNSPECIFIED TRIMESTER: ICD-10-CM

## 2019-06-14 LAB
ALBUMIN SERPL ELPH-MCNC: 3.3 G/DL — SIGNIFICANT CHANGE UP (ref 3.3–5)
ALBUMIN SERPL ELPH-MCNC: 3.4 G/DL — SIGNIFICANT CHANGE UP (ref 3.3–5)
ALP SERPL-CCNC: 183 U/L — HIGH (ref 40–120)
ALP SERPL-CCNC: 185 U/L — HIGH (ref 40–120)
ALT FLD-CCNC: 119 U/L — HIGH (ref 4–33)
ALT FLD-CCNC: 120 U/L — HIGH (ref 4–33)
ANION GAP SERPL CALC-SCNC: 14 MMO/L — SIGNIFICANT CHANGE UP (ref 7–14)
ANION GAP SERPL CALC-SCNC: 17 MMO/L — HIGH (ref 7–14)
APPEARANCE UR: CLEAR — SIGNIFICANT CHANGE UP
APTT BLD: 30.7 SEC — SIGNIFICANT CHANGE UP (ref 27.5–36.3)
APTT BLD: 33.1 SEC — SIGNIFICANT CHANGE UP (ref 27.5–36.3)
AST SERPL-CCNC: 46 U/L — HIGH (ref 4–32)
AST SERPL-CCNC: 48 U/L — HIGH (ref 4–32)
BASOPHILS # BLD AUTO: 0.05 K/UL — SIGNIFICANT CHANGE UP (ref 0–0.2)
BASOPHILS # BLD AUTO: 0.06 K/UL — SIGNIFICANT CHANGE UP (ref 0–0.2)
BASOPHILS NFR BLD AUTO: 0.4 % — SIGNIFICANT CHANGE UP (ref 0–2)
BASOPHILS NFR BLD AUTO: 0.5 % — SIGNIFICANT CHANGE UP (ref 0–2)
BILIRUB SERPL-MCNC: 0.2 MG/DL — SIGNIFICANT CHANGE UP (ref 0.2–1.2)
BILIRUB SERPL-MCNC: 0.3 MG/DL — SIGNIFICANT CHANGE UP (ref 0.2–1.2)
BILIRUB UR-MCNC: NEGATIVE — SIGNIFICANT CHANGE UP
BLD GP AB SCN SERPL QL: NEGATIVE — SIGNIFICANT CHANGE UP
BLOOD UR QL VISUAL: NEGATIVE — SIGNIFICANT CHANGE UP
BUN SERPL-MCNC: 10 MG/DL — SIGNIFICANT CHANGE UP (ref 7–23)
BUN SERPL-MCNC: 8 MG/DL — SIGNIFICANT CHANGE UP (ref 7–23)
CALCIUM SERPL-MCNC: 9 MG/DL — SIGNIFICANT CHANGE UP (ref 8.4–10.5)
CALCIUM SERPL-MCNC: 9.9 MG/DL — SIGNIFICANT CHANGE UP (ref 8.4–10.5)
CHLORIDE SERPL-SCNC: 101 MMOL/L — SIGNIFICANT CHANGE UP (ref 98–107)
CHLORIDE SERPL-SCNC: 102 MMOL/L — SIGNIFICANT CHANGE UP (ref 98–107)
CO2 SERPL-SCNC: 17 MMOL/L — LOW (ref 22–31)
CO2 SERPL-SCNC: 19 MMOL/L — LOW (ref 22–31)
COLOR SPEC: SIGNIFICANT CHANGE UP
CREAT ?TM UR-MCNC: 47.5 MG/DL — SIGNIFICANT CHANGE UP
CREAT SERPL-MCNC: 0.49 MG/DL — LOW (ref 0.5–1.3)
CREAT SERPL-MCNC: 0.5 MG/DL — SIGNIFICANT CHANGE UP (ref 0.5–1.3)
EOSINOPHIL # BLD AUTO: 0.13 K/UL — SIGNIFICANT CHANGE UP (ref 0–0.5)
EOSINOPHIL # BLD AUTO: 0.16 K/UL — SIGNIFICANT CHANGE UP (ref 0–0.5)
EOSINOPHIL NFR BLD AUTO: 1.1 % — SIGNIFICANT CHANGE UP (ref 0–6)
EOSINOPHIL NFR BLD AUTO: 1.3 % — SIGNIFICANT CHANGE UP (ref 0–6)
FIBRINOGEN PPP-MCNC: 691.8 MG/DL — HIGH (ref 350–510)
FIBRINOGEN PPP-MCNC: 699 MG/DL — HIGH (ref 350–510)
GLUCOSE BLDC GLUCOMTR-MCNC: 70 MG/DL — SIGNIFICANT CHANGE UP (ref 70–99)
GLUCOSE BLDC GLUCOMTR-MCNC: 83 MG/DL — SIGNIFICANT CHANGE UP (ref 70–99)
GLUCOSE BLDC GLUCOMTR-MCNC: 85 MG/DL — SIGNIFICANT CHANGE UP (ref 70–99)
GLUCOSE SERPL-MCNC: 73 MG/DL — SIGNIFICANT CHANGE UP (ref 70–99)
GLUCOSE SERPL-MCNC: 74 MG/DL — SIGNIFICANT CHANGE UP (ref 70–99)
GLUCOSE UR-MCNC: NEGATIVE — SIGNIFICANT CHANGE UP
HCT VFR BLD CALC: 41.4 % — SIGNIFICANT CHANGE UP (ref 34.5–45)
HCT VFR BLD CALC: 41.5 % — SIGNIFICANT CHANGE UP (ref 34.5–45)
HGB BLD-MCNC: 13.4 G/DL — SIGNIFICANT CHANGE UP (ref 11.5–15.5)
HGB BLD-MCNC: 13.5 G/DL — SIGNIFICANT CHANGE UP (ref 11.5–15.5)
IMM GRANULOCYTES NFR BLD AUTO: 0.8 % — SIGNIFICANT CHANGE UP (ref 0–1.5)
IMM GRANULOCYTES NFR BLD AUTO: 1.1 % — SIGNIFICANT CHANGE UP (ref 0–1.5)
INR BLD: 0.87 — LOW (ref 0.88–1.17)
INR BLD: 0.91 — SIGNIFICANT CHANGE UP (ref 0.88–1.17)
KETONES UR-MCNC: SIGNIFICANT CHANGE UP
LDH SERPL L TO P-CCNC: 278 U/L — HIGH (ref 135–225)
LDH SERPL L TO P-CCNC: 293 U/L — HIGH (ref 135–225)
LEUKOCYTE ESTERASE UR-ACNC: NEGATIVE — SIGNIFICANT CHANGE UP
LYMPHOCYTES # BLD AUTO: 2.67 K/UL — SIGNIFICANT CHANGE UP (ref 1–3.3)
LYMPHOCYTES # BLD AUTO: 22.6 % — SIGNIFICANT CHANGE UP (ref 13–44)
LYMPHOCYTES # BLD AUTO: 25.7 % — SIGNIFICANT CHANGE UP (ref 13–44)
LYMPHOCYTES # BLD AUTO: 3.1 K/UL — SIGNIFICANT CHANGE UP (ref 1–3.3)
MAGNESIUM SERPL-MCNC: 4.7 MG/DL — HIGH (ref 1.6–2.6)
MCHC RBC-ENTMCNC: 27 PG — SIGNIFICANT CHANGE UP (ref 27–34)
MCHC RBC-ENTMCNC: 27.2 PG — SIGNIFICANT CHANGE UP (ref 27–34)
MCHC RBC-ENTMCNC: 32.3 % — SIGNIFICANT CHANGE UP (ref 32–36)
MCHC RBC-ENTMCNC: 32.6 % — SIGNIFICANT CHANGE UP (ref 32–36)
MCV RBC AUTO: 83.5 FL — SIGNIFICANT CHANGE UP (ref 80–100)
MCV RBC AUTO: 83.7 FL — SIGNIFICANT CHANGE UP (ref 80–100)
MONOCYTES # BLD AUTO: 0.82 K/UL — SIGNIFICANT CHANGE UP (ref 0–0.9)
MONOCYTES # BLD AUTO: 0.85 K/UL — SIGNIFICANT CHANGE UP (ref 0–0.9)
MONOCYTES NFR BLD AUTO: 6.9 % — SIGNIFICANT CHANGE UP (ref 2–14)
MONOCYTES NFR BLD AUTO: 7 % — SIGNIFICANT CHANGE UP (ref 2–14)
NEUTROPHILS # BLD AUTO: 7.81 K/UL — HIGH (ref 1.8–7.4)
NEUTROPHILS # BLD AUTO: 8 K/UL — HIGH (ref 1.8–7.4)
NEUTROPHILS NFR BLD AUTO: 64.7 % — SIGNIFICANT CHANGE UP (ref 43–77)
NEUTROPHILS NFR BLD AUTO: 67.9 % — SIGNIFICANT CHANGE UP (ref 43–77)
NITRITE UR-MCNC: NEGATIVE — SIGNIFICANT CHANGE UP
NRBC # FLD: 0 K/UL — SIGNIFICANT CHANGE UP (ref 0–0)
NRBC # FLD: 0 K/UL — SIGNIFICANT CHANGE UP (ref 0–0)
PH UR: 6 — SIGNIFICANT CHANGE UP (ref 5–8)
PLATELET # BLD AUTO: 253 K/UL — SIGNIFICANT CHANGE UP (ref 150–400)
PLATELET # BLD AUTO: 262 K/UL — SIGNIFICANT CHANGE UP (ref 150–400)
PMV BLD: 12.1 FL — SIGNIFICANT CHANGE UP (ref 7–13)
PMV BLD: 12.2 FL — SIGNIFICANT CHANGE UP (ref 7–13)
POTASSIUM SERPL-MCNC: 4.4 MMOL/L — SIGNIFICANT CHANGE UP (ref 3.5–5.3)
POTASSIUM SERPL-MCNC: 4.6 MMOL/L — SIGNIFICANT CHANGE UP (ref 3.5–5.3)
POTASSIUM SERPL-SCNC: 4.4 MMOL/L — SIGNIFICANT CHANGE UP (ref 3.5–5.3)
POTASSIUM SERPL-SCNC: 4.6 MMOL/L — SIGNIFICANT CHANGE UP (ref 3.5–5.3)
PROT SERPL-MCNC: 6.8 G/DL — SIGNIFICANT CHANGE UP (ref 6–8.3)
PROT SERPL-MCNC: 6.9 G/DL — SIGNIFICANT CHANGE UP (ref 6–8.3)
PROT UR-MCNC: 8.2 MG/DL — SIGNIFICANT CHANGE UP
PROT UR-MCNC: NEGATIVE — SIGNIFICANT CHANGE UP
PROTHROM AB SERPL-ACNC: 10.1 SEC — SIGNIFICANT CHANGE UP (ref 9.8–13.1)
PROTHROM AB SERPL-ACNC: 9.9 SEC — SIGNIFICANT CHANGE UP (ref 9.8–13.1)
RBC # BLD: 4.96 M/UL — SIGNIFICANT CHANGE UP (ref 3.8–5.2)
RBC # BLD: 4.96 M/UL — SIGNIFICANT CHANGE UP (ref 3.8–5.2)
RBC # FLD: 14.6 % — HIGH (ref 10.3–14.5)
RBC # FLD: 14.7 % — HIGH (ref 10.3–14.5)
RH IG SCN BLD-IMP: POSITIVE — SIGNIFICANT CHANGE UP
SODIUM SERPL-SCNC: 135 MMOL/L — SIGNIFICANT CHANGE UP (ref 135–145)
SODIUM SERPL-SCNC: 135 MMOL/L — SIGNIFICANT CHANGE UP (ref 135–145)
SP GR SPEC: 1.01 — SIGNIFICANT CHANGE UP (ref 1–1.04)
URATE SERPL-MCNC: 7.9 MG/DL — HIGH (ref 2.5–7)
URATE SERPL-MCNC: 8.4 MG/DL — HIGH (ref 2.5–7)
UROBILINOGEN FLD QL: NORMAL — SIGNIFICANT CHANGE UP
WBC # BLD: 11.8 K/UL — HIGH (ref 3.8–10.5)
WBC # BLD: 12.08 K/UL — HIGH (ref 3.8–10.5)
WBC # FLD AUTO: 11.8 K/UL — HIGH (ref 3.8–10.5)
WBC # FLD AUTO: 12.08 K/UL — HIGH (ref 3.8–10.5)

## 2019-06-14 PROCEDURE — 76818 FETAL BIOPHYS PROFILE W/NST: CPT | Mod: 26

## 2019-06-14 PROCEDURE — 99214 OFFICE O/P EST MOD 30 MIN: CPT | Mod: 25

## 2019-06-14 PROCEDURE — 76828 ECHO EXAM OF FETAL HEART: CPT

## 2019-06-14 PROCEDURE — 76821 MIDDLE CEREBRAL ARTERY ECHO: CPT

## 2019-06-14 PROCEDURE — 76816 OB US FOLLOW-UP PER FETUS: CPT

## 2019-06-14 PROCEDURE — 76820 UMBILICAL ARTERY ECHO: CPT

## 2019-06-14 RX ORDER — OXYTOCIN 10 UNIT/ML
333.33 VIAL (ML) INJECTION
Qty: 20 | Refills: 0 | Status: DISCONTINUED | OUTPATIENT
Start: 2019-06-14 | End: 2019-06-15

## 2019-06-14 RX ORDER — MAGNESIUM SULFATE 500 MG/ML
2 VIAL (ML) INJECTION
Qty: 40 | Refills: 0 | Status: DISCONTINUED | OUTPATIENT
Start: 2019-06-14 | End: 2019-06-15

## 2019-06-14 RX ORDER — SODIUM CHLORIDE 9 MG/ML
1000 INJECTION, SOLUTION INTRAVENOUS
Refills: 0 | Status: DISCONTINUED | OUTPATIENT
Start: 2019-06-14 | End: 2019-06-14

## 2019-06-14 RX ORDER — MAGNESIUM SULFATE 500 MG/ML
4 VIAL (ML) INJECTION ONCE
Refills: 0 | Status: COMPLETED | OUTPATIENT
Start: 2019-06-14 | End: 2019-06-14

## 2019-06-14 RX ORDER — ACETAMINOPHEN 500 MG
975 TABLET ORAL EVERY 6 HOURS
Refills: 0 | Status: DISCONTINUED | OUTPATIENT
Start: 2019-06-14 | End: 2019-06-17

## 2019-06-14 RX ORDER — CITRIC ACID/SODIUM CITRATE 300-500 MG
15 SOLUTION, ORAL ORAL EVERY 6 HOURS
Refills: 0 | Status: DISCONTINUED | OUTPATIENT
Start: 2019-06-14 | End: 2019-06-15

## 2019-06-14 RX ORDER — SODIUM CHLORIDE 9 MG/ML
1000 INJECTION INTRAMUSCULAR; INTRAVENOUS; SUBCUTANEOUS
Refills: 0 | Status: DISCONTINUED | OUTPATIENT
Start: 2019-06-14 | End: 2019-06-14

## 2019-06-14 RX ORDER — SODIUM CHLORIDE 9 MG/ML
1000 INJECTION, SOLUTION INTRAVENOUS
Refills: 0 | Status: DISCONTINUED | OUTPATIENT
Start: 2019-06-14 | End: 2019-06-15

## 2019-06-14 RX ORDER — SODIUM CHLORIDE 9 MG/ML
1000 INJECTION INTRAMUSCULAR; INTRAVENOUS; SUBCUTANEOUS
Refills: 0 | Status: DISCONTINUED | OUTPATIENT
Start: 2019-06-14 | End: 2019-06-16

## 2019-06-14 RX ADMIN — Medication 975 MILLIGRAM(S): at 21:50

## 2019-06-14 RX ADMIN — SODIUM CHLORIDE 50 MILLILITER(S): 9 INJECTION, SOLUTION INTRAVENOUS at 19:44

## 2019-06-14 RX ADMIN — Medication 50 GM/HR: at 14:46

## 2019-06-14 RX ADMIN — Medication 975 MILLIGRAM(S): at 21:24

## 2019-06-14 RX ADMIN — Medication 50 GM/HR: at 19:17

## 2019-06-14 RX ADMIN — Medication 100 GRAM(S): at 14:27

## 2019-06-14 NOTE — OB PROVIDER H&P - NSHPPHYSICALEXAM_GEN_ALL_CORE
PE:  T(C): 37.1 (06-14-19 @ 13:39), Max: 37.1 (06-14-19 @ 13:39)  HR: 76 (06-14-19 @ 13:39) (76 - 76)  BP: 131/76 (06-14-19 @ 13:39) (131/76 - 131/76)  RR: 17 (06-14-19 @ 13:39) (17 - 17)  SpO2: 99% (06-14-19 @ 13:39) (99% - 99%)  Abd: gravid soft nontender  CV: rrr  EFM: 145 minimal variability + acels no decels   [place]: none  VE: 0/30/-3  Sono: cephalic/ posterior placenta/ SALVADOR 7/ BPP 8/8 per ATU sono EFW:1629grams

## 2019-06-14 NOTE — OB PROVIDER H&P - HISTORY OF PRESENT ILLNESS
Pt is a 25y/o  EDC 19 at 35.0 weeks presenting to L&D for induction of labor from ATU due to severe IUGR (0%) no interval growth, elevated umbilical artery dopplers, Cat II FHT, and sPEC based on proteinuria and elevated liver enzymes. Patient denies contractions, leaking fluid, vaginal bleeding, endorses good fetal movement. Prenatal course complicated by GDM A1, PEC (now sPEC), and IUGR. GBS neg. EFW 1629grams by sono in ATU today (). Pt accepts all blood products.     Allergies:NKDA  Meds: PNV    Medhx: pt denies hx of HTN before this pregnancy  Obhx: current, etop (medical)   Gynhx: pt denies cysts, fibroids, abn paps,stds  Sxhx: pt denies  Psychx: pt denies  Sochx: pt denies etoh, tobacco, and drug use  Famhx: mother and father with DM typeII Pt is a 23y/o  EDC 19 at 35.0 weeks presenting to L&D for induction of labor from ATU due to severe IUGR (0%) no interval growth, elevated umbilical artery dopplers, Cat II FHT, and sPEC based on proteinuria and elevated liver enzymes. Patient denies contractions, leaking fluid, vaginal bleeding, endorses good fetal movement. Prenatal course complicated by GDM A1, PEC (now sPEC) 24 hr protein level 510, and IUGR. s/p BMZ from -. GBS neg. EFW 1629grams by sono in ATU today (). Pt accepts all blood products.     Allergies:NKDA  Meds: PNV    Medhx: pt denies hx of HTN before this pregnancy  Obhx: current, etop (medical)   Gynhx: pt denies cysts, fibroids, abn paps,stds  Sxhx: pt denies  Psychx: pt denies  Sochx: pt denies etoh, tobacco, and drug use  Famhx: mother and father with DM typeII

## 2019-06-14 NOTE — OB RN PATIENT PROFILE - PRESSURE ULCER(S)
----- Message from Olya Lima sent at 2/13/2018  9:37 AM CST -----  Contact: Pt   States she's calling to be worked in to see Dr for pain in right knee and can be reached at [phone]//thanks/teodoro    no

## 2019-06-14 NOTE — OB PROVIDER H&P - ASSESSMENT
A+P: pt is a P0 at 35 weeks presenting for IOL d/t spec/IUGR 0%/elevated dopplers; [id number]  -admit to L&D  -HELLP labs  -MgSo4 infusion for seizure ppx  -efm/toco  -PO cytotec  -blood glucose monitoring  -IV hydration  -bicitra  -anesthesia consult    d/w Dr. Evie Arteaga [facility]

## 2019-06-15 ENCOUNTER — TRANSCRIPTION ENCOUNTER (OUTPATIENT)
Age: 24
End: 2019-06-15

## 2019-06-15 ENCOUNTER — RESULT REVIEW (OUTPATIENT)
Age: 24
End: 2019-06-15

## 2019-06-15 LAB
ALBUMIN SERPL ELPH-MCNC: 3.4 G/DL — SIGNIFICANT CHANGE UP (ref 3.3–5)
ALBUMIN SERPL ELPH-MCNC: 3.6 G/DL — SIGNIFICANT CHANGE UP (ref 3.3–5)
ALP SERPL-CCNC: 180 U/L — HIGH (ref 40–120)
ALP SERPL-CCNC: 186 U/L — HIGH (ref 40–120)
ALT FLD-CCNC: 121 U/L — HIGH (ref 4–33)
ALT FLD-CCNC: 122 U/L — HIGH (ref 4–33)
ANION GAP SERPL CALC-SCNC: 12 MMO/L — SIGNIFICANT CHANGE UP (ref 7–14)
ANION GAP SERPL CALC-SCNC: 15 MMO/L — HIGH (ref 7–14)
APTT BLD: 32.1 SEC — SIGNIFICANT CHANGE UP (ref 27.5–36.3)
AST SERPL-CCNC: 51 U/L — HIGH (ref 4–32)
AST SERPL-CCNC: 54 U/L — HIGH (ref 4–32)
BASOPHILS # BLD AUTO: 0.06 K/UL — SIGNIFICANT CHANGE UP (ref 0–0.2)
BASOPHILS # BLD AUTO: 0.07 K/UL — SIGNIFICANT CHANGE UP (ref 0–0.2)
BASOPHILS NFR BLD AUTO: 0.5 % — SIGNIFICANT CHANGE UP (ref 0–2)
BASOPHILS NFR BLD AUTO: 0.6 % — SIGNIFICANT CHANGE UP (ref 0–2)
BILIRUB SERPL-MCNC: 0.3 MG/DL — SIGNIFICANT CHANGE UP (ref 0.2–1.2)
BILIRUB SERPL-MCNC: 0.3 MG/DL — SIGNIFICANT CHANGE UP (ref 0.2–1.2)
BUN SERPL-MCNC: 8 MG/DL — SIGNIFICANT CHANGE UP (ref 7–23)
BUN SERPL-MCNC: 8 MG/DL — SIGNIFICANT CHANGE UP (ref 7–23)
CALCIUM SERPL-MCNC: 8.1 MG/DL — LOW (ref 8.4–10.5)
CALCIUM SERPL-MCNC: 8.3 MG/DL — LOW (ref 8.4–10.5)
CHLORIDE SERPL-SCNC: 97 MMOL/L — LOW (ref 98–107)
CHLORIDE SERPL-SCNC: 98 MMOL/L — SIGNIFICANT CHANGE UP (ref 98–107)
CO2 SERPL-SCNC: 20 MMOL/L — LOW (ref 22–31)
CO2 SERPL-SCNC: 21 MMOL/L — LOW (ref 22–31)
CREAT SERPL-MCNC: 0.5 MG/DL — SIGNIFICANT CHANGE UP (ref 0.5–1.3)
CREAT SERPL-MCNC: 0.53 MG/DL — SIGNIFICANT CHANGE UP (ref 0.5–1.3)
EOSINOPHIL # BLD AUTO: 0.19 K/UL — SIGNIFICANT CHANGE UP (ref 0–0.5)
EOSINOPHIL # BLD AUTO: 0.21 K/UL — SIGNIFICANT CHANGE UP (ref 0–0.5)
EOSINOPHIL NFR BLD AUTO: 1.6 % — SIGNIFICANT CHANGE UP (ref 0–6)
EOSINOPHIL NFR BLD AUTO: 1.8 % — SIGNIFICANT CHANGE UP (ref 0–6)
FIBRINOGEN PPP-MCNC: 686.6 MG/DL — HIGH (ref 350–510)
FIBRINOGEN PPP-MCNC: 694.5 MG/DL — HIGH (ref 350–510)
GLUCOSE BLDC GLUCOMTR-MCNC: 76 MG/DL — SIGNIFICANT CHANGE UP (ref 70–99)
GLUCOSE BLDC GLUCOMTR-MCNC: 83 MG/DL — SIGNIFICANT CHANGE UP (ref 70–99)
GLUCOSE BLDC GLUCOMTR-MCNC: 84 MG/DL — SIGNIFICANT CHANGE UP (ref 70–99)
GLUCOSE SERPL-MCNC: 76 MG/DL — SIGNIFICANT CHANGE UP (ref 70–99)
GLUCOSE SERPL-MCNC: 90 MG/DL — SIGNIFICANT CHANGE UP (ref 70–99)
HCT VFR BLD CALC: 41.6 % — SIGNIFICANT CHANGE UP (ref 34.5–45)
HCT VFR BLD CALC: 41.8 % — SIGNIFICANT CHANGE UP (ref 34.5–45)
HGB BLD-MCNC: 13.3 G/DL — SIGNIFICANT CHANGE UP (ref 11.5–15.5)
HGB BLD-MCNC: 13.3 G/DL — SIGNIFICANT CHANGE UP (ref 11.5–15.5)
IMM GRANULOCYTES NFR BLD AUTO: 0.7 % — SIGNIFICANT CHANGE UP (ref 0–1.5)
IMM GRANULOCYTES NFR BLD AUTO: 0.9 % — SIGNIFICANT CHANGE UP (ref 0–1.5)
INR BLD: 0.88 — SIGNIFICANT CHANGE UP (ref 0.88–1.17)
INR BLD: 0.9 — SIGNIFICANT CHANGE UP (ref 0.88–1.17)
LDH SERPL L TO P-CCNC: 216 U/L — SIGNIFICANT CHANGE UP (ref 135–225)
LDH SERPL L TO P-CCNC: 227 U/L — HIGH (ref 135–225)
LYMPHOCYTES # BLD AUTO: 17.4 % — SIGNIFICANT CHANGE UP (ref 13–44)
LYMPHOCYTES # BLD AUTO: 2.12 K/UL — SIGNIFICANT CHANGE UP (ref 1–3.3)
LYMPHOCYTES # BLD AUTO: 2.93 K/UL — SIGNIFICANT CHANGE UP (ref 1–3.3)
LYMPHOCYTES # BLD AUTO: 25.7 % — SIGNIFICANT CHANGE UP (ref 13–44)
MAGNESIUM SERPL-MCNC: 4.7 MG/DL — HIGH (ref 1.6–2.6)
MAGNESIUM SERPL-MCNC: 5.3 MG/DL — HIGH (ref 1.6–2.6)
MAGNESIUM SERPL-MCNC: 5.6 MG/DL — HIGH (ref 1.6–2.6)
MAGNESIUM SERPL-MCNC: 5.6 MG/DL — HIGH (ref 1.6–2.6)
MCHC RBC-ENTMCNC: 26.4 PG — LOW (ref 27–34)
MCHC RBC-ENTMCNC: 26.9 PG — LOW (ref 27–34)
MCHC RBC-ENTMCNC: 31.8 % — LOW (ref 32–36)
MCHC RBC-ENTMCNC: 32 % — SIGNIFICANT CHANGE UP (ref 32–36)
MCV RBC AUTO: 82.7 FL — SIGNIFICANT CHANGE UP (ref 80–100)
MCV RBC AUTO: 84.4 FL — SIGNIFICANT CHANGE UP (ref 80–100)
MONOCYTES # BLD AUTO: 0.87 K/UL — SIGNIFICANT CHANGE UP (ref 0–0.9)
MONOCYTES # BLD AUTO: 0.9 K/UL — SIGNIFICANT CHANGE UP (ref 0–0.9)
MONOCYTES NFR BLD AUTO: 7.1 % — SIGNIFICANT CHANGE UP (ref 2–14)
MONOCYTES NFR BLD AUTO: 7.9 % — SIGNIFICANT CHANGE UP (ref 2–14)
NEUTROPHILS # BLD AUTO: 7.21 K/UL — SIGNIFICANT CHANGE UP (ref 1.8–7.4)
NEUTROPHILS # BLD AUTO: 8.83 K/UL — HIGH (ref 1.8–7.4)
NEUTROPHILS NFR BLD AUTO: 63.2 % — SIGNIFICANT CHANGE UP (ref 43–77)
NEUTROPHILS NFR BLD AUTO: 72.6 % — SIGNIFICANT CHANGE UP (ref 43–77)
NRBC # FLD: 0 K/UL — SIGNIFICANT CHANGE UP (ref 0–0)
NRBC # FLD: 0 K/UL — SIGNIFICANT CHANGE UP (ref 0–0)
PLATELET # BLD AUTO: 238 K/UL — SIGNIFICANT CHANGE UP (ref 150–400)
PLATELET # BLD AUTO: 240 K/UL — SIGNIFICANT CHANGE UP (ref 150–400)
PMV BLD: 11.9 FL — SIGNIFICANT CHANGE UP (ref 7–13)
PMV BLD: 12.3 FL — SIGNIFICANT CHANGE UP (ref 7–13)
POTASSIUM SERPL-MCNC: 3.9 MMOL/L — SIGNIFICANT CHANGE UP (ref 3.5–5.3)
POTASSIUM SERPL-MCNC: 4.1 MMOL/L — SIGNIFICANT CHANGE UP (ref 3.5–5.3)
POTASSIUM SERPL-SCNC: 3.9 MMOL/L — SIGNIFICANT CHANGE UP (ref 3.5–5.3)
POTASSIUM SERPL-SCNC: 4.1 MMOL/L — SIGNIFICANT CHANGE UP (ref 3.5–5.3)
PROT SERPL-MCNC: 6.9 G/DL — SIGNIFICANT CHANGE UP (ref 6–8.3)
PROT SERPL-MCNC: 7 G/DL — SIGNIFICANT CHANGE UP (ref 6–8.3)
PROTHROM AB SERPL-ACNC: 10 SEC — SIGNIFICANT CHANGE UP (ref 9.8–13.1)
PROTHROM AB SERPL-ACNC: 9.7 SEC — LOW (ref 9.8–13.1)
RBC # BLD: 4.95 M/UL — SIGNIFICANT CHANGE UP (ref 3.8–5.2)
RBC # BLD: 5.03 M/UL — SIGNIFICANT CHANGE UP (ref 3.8–5.2)
RBC # FLD: 14.7 % — HIGH (ref 10.3–14.5)
RBC # FLD: 14.8 % — HIGH (ref 10.3–14.5)
SODIUM SERPL-SCNC: 131 MMOL/L — LOW (ref 135–145)
SODIUM SERPL-SCNC: 132 MMOL/L — LOW (ref 135–145)
T PALLIDUM AB TITR SER: NEGATIVE — SIGNIFICANT CHANGE UP
URATE SERPL-MCNC: 8.5 MG/DL — HIGH (ref 2.5–7)
URATE SERPL-MCNC: 8.6 MG/DL — HIGH (ref 2.5–7)
WBC # BLD: 11.41 K/UL — HIGH (ref 3.8–10.5)
WBC # BLD: 12.17 K/UL — HIGH (ref 3.8–10.5)
WBC # FLD AUTO: 11.41 K/UL — HIGH (ref 3.8–10.5)
WBC # FLD AUTO: 12.17 K/UL — HIGH (ref 3.8–10.5)

## 2019-06-15 PROCEDURE — 88307 TISSUE EXAM BY PATHOLOGIST: CPT | Mod: 26

## 2019-06-15 PROCEDURE — 59400 OBSTETRICAL CARE: CPT | Mod: U7,UB,GC

## 2019-06-15 RX ORDER — OXYTOCIN 10 UNIT/ML
2 VIAL (ML) INJECTION
Qty: 30 | Refills: 0 | Status: DISCONTINUED | OUTPATIENT
Start: 2019-06-15 | End: 2019-06-15

## 2019-06-15 RX ORDER — SIMETHICONE 80 MG/1
80 TABLET, CHEWABLE ORAL EVERY 4 HOURS
Refills: 0 | Status: DISCONTINUED | OUTPATIENT
Start: 2019-06-15 | End: 2019-06-17

## 2019-06-15 RX ORDER — KETOROLAC TROMETHAMINE 30 MG/ML
30 SYRINGE (ML) INJECTION ONCE
Refills: 0 | Status: DISCONTINUED | OUTPATIENT
Start: 2019-06-15 | End: 2019-06-15

## 2019-06-15 RX ORDER — GLYCERIN ADULT
1 SUPPOSITORY, RECTAL RECTAL AT BEDTIME
Refills: 0 | Status: DISCONTINUED | OUTPATIENT
Start: 2019-06-15 | End: 2019-06-17

## 2019-06-15 RX ORDER — SODIUM CHLORIDE 9 MG/ML
1000 INJECTION, SOLUTION INTRAVENOUS
Refills: 0 | Status: DISCONTINUED | OUTPATIENT
Start: 2019-06-15 | End: 2019-06-16

## 2019-06-15 RX ORDER — OXYCODONE HYDROCHLORIDE 5 MG/1
5 TABLET ORAL
Refills: 0 | Status: DISCONTINUED | OUTPATIENT
Start: 2019-06-15 | End: 2019-06-17

## 2019-06-15 RX ORDER — DIBUCAINE 1 %
1 OINTMENT (GRAM) RECTAL EVERY 6 HOURS
Refills: 0 | Status: DISCONTINUED | OUTPATIENT
Start: 2019-06-15 | End: 2019-06-17

## 2019-06-15 RX ORDER — TETANUS TOXOID, REDUCED DIPHTHERIA TOXOID AND ACELLULAR PERTUSSIS VACCINE, ADSORBED 5; 2.5; 8; 8; 2.5 [IU]/.5ML; [IU]/.5ML; UG/.5ML; UG/.5ML; UG/.5ML
0.5 SUSPENSION INTRAMUSCULAR ONCE
Refills: 0 | Status: DISCONTINUED | OUTPATIENT
Start: 2019-06-15 | End: 2019-06-17

## 2019-06-15 RX ORDER — BENZOCAINE 10 %
1 GEL (GRAM) MUCOUS MEMBRANE EVERY 6 HOURS
Refills: 0 | Status: DISCONTINUED | OUTPATIENT
Start: 2019-06-15 | End: 2019-06-17

## 2019-06-15 RX ORDER — IBUPROFEN 200 MG
600 TABLET ORAL EVERY 6 HOURS
Refills: 0 | Status: DISCONTINUED | OUTPATIENT
Start: 2019-06-15 | End: 2019-06-17

## 2019-06-15 RX ORDER — IBUPROFEN 200 MG
1 TABLET ORAL
Qty: 0 | Refills: 0 | DISCHARGE
Start: 2019-06-15

## 2019-06-15 RX ORDER — ACETAMINOPHEN 500 MG
3 TABLET ORAL
Qty: 0 | Refills: 0 | DISCHARGE
Start: 2019-06-15

## 2019-06-15 RX ORDER — LANOLIN
1 OINTMENT (GRAM) TOPICAL EVERY 6 HOURS
Refills: 0 | Status: DISCONTINUED | OUTPATIENT
Start: 2019-06-15 | End: 2019-06-17

## 2019-06-15 RX ORDER — HYDROCORTISONE 1 %
1 OINTMENT (GRAM) TOPICAL EVERY 6 HOURS
Refills: 0 | Status: DISCONTINUED | OUTPATIENT
Start: 2019-06-15 | End: 2019-06-17

## 2019-06-15 RX ORDER — MAGNESIUM HYDROXIDE 400 MG/1
30 TABLET, CHEWABLE ORAL
Refills: 0 | Status: DISCONTINUED | OUTPATIENT
Start: 2019-06-15 | End: 2019-06-17

## 2019-06-15 RX ORDER — DIPHENHYDRAMINE HCL 50 MG
25 CAPSULE ORAL EVERY 6 HOURS
Refills: 0 | Status: DISCONTINUED | OUTPATIENT
Start: 2019-06-15 | End: 2019-06-17

## 2019-06-15 RX ORDER — MAGNESIUM SULFATE 500 MG/ML
2 VIAL (ML) INJECTION
Qty: 40 | Refills: 0 | Status: DISCONTINUED | OUTPATIENT
Start: 2019-06-15 | End: 2019-06-16

## 2019-06-15 RX ORDER — OXYCODONE HYDROCHLORIDE 5 MG/1
5 TABLET ORAL ONCE
Refills: 0 | Status: DISCONTINUED | OUTPATIENT
Start: 2019-06-15 | End: 2019-06-17

## 2019-06-15 RX ORDER — AER TRAVELER 0.5 G/1
1 SOLUTION RECTAL; TOPICAL EVERY 4 HOURS
Refills: 0 | Status: DISCONTINUED | OUTPATIENT
Start: 2019-06-15 | End: 2019-06-17

## 2019-06-15 RX ORDER — DOCUSATE SODIUM 100 MG
100 CAPSULE ORAL
Refills: 0 | Status: DISCONTINUED | OUTPATIENT
Start: 2019-06-15 | End: 2019-06-17

## 2019-06-15 RX ORDER — PRAMOXINE HYDROCHLORIDE 150 MG/15G
1 AEROSOL, FOAM RECTAL EVERY 4 HOURS
Refills: 0 | Status: DISCONTINUED | OUTPATIENT
Start: 2019-06-15 | End: 2019-06-17

## 2019-06-15 RX ORDER — ACETAMINOPHEN 500 MG
975 TABLET ORAL EVERY 6 HOURS
Refills: 0 | Status: DISCONTINUED | OUTPATIENT
Start: 2019-06-15 | End: 2019-06-17

## 2019-06-15 RX ORDER — CITRIC ACID/SODIUM CITRATE 300-500 MG
15 SOLUTION, ORAL ORAL EVERY 6 HOURS
Refills: 0 | Status: DISCONTINUED | OUTPATIENT
Start: 2019-06-15 | End: 2019-06-15

## 2019-06-15 RX ORDER — MAGNESIUM SULFATE 500 MG/ML
2 VIAL (ML) INJECTION
Qty: 40 | Refills: 0 | Status: DISCONTINUED | OUTPATIENT
Start: 2019-06-15 | End: 2019-06-15

## 2019-06-15 RX ORDER — OXYTOCIN 10 UNIT/ML
16.67 VIAL (ML) INJECTION
Qty: 20 | Refills: 0 | Status: DISCONTINUED | OUTPATIENT
Start: 2019-06-15 | End: 2019-06-17

## 2019-06-15 RX ORDER — SODIUM CHLORIDE 9 MG/ML
3 INJECTION INTRAMUSCULAR; INTRAVENOUS; SUBCUTANEOUS EVERY 8 HOURS
Refills: 0 | Status: DISCONTINUED | OUTPATIENT
Start: 2019-06-15 | End: 2019-06-17

## 2019-06-15 RX ADMIN — SODIUM CHLORIDE 50 MILLILITER(S): 9 INJECTION, SOLUTION INTRAVENOUS at 09:54

## 2019-06-15 RX ADMIN — Medication 50 MILLIUNIT(S)/MIN: at 15:51

## 2019-06-15 RX ADMIN — Medication 50 GM/HR: at 19:11

## 2019-06-15 RX ADMIN — Medication 975 MILLIGRAM(S): at 06:05

## 2019-06-15 RX ADMIN — Medication 50 GM/HR: at 16:36

## 2019-06-15 RX ADMIN — Medication 50 GM/HR: at 09:54

## 2019-06-15 RX ADMIN — SODIUM CHLORIDE 50 MILLILITER(S): 9 INJECTION, SOLUTION INTRAVENOUS at 19:11

## 2019-06-15 RX ADMIN — Medication 50 GM/HR: at 07:08

## 2019-06-15 RX ADMIN — Medication 975 MILLIGRAM(S): at 05:49

## 2019-06-15 NOTE — DISCHARGE NOTE OB - CARE PLAN
Principal Discharge DX:	Vaginal delivery  Goal:	full recovery  Assessment and plan of treatment:	regular diet, regular activity, follow up 6 weeks  Secondary Diagnosis:	Pre-eclampsia in third trimester  Goal:	blood pressure control  Assessment and plan of treatment:	follow up in office next week for BP check

## 2019-06-15 NOTE — DISCHARGE NOTE OB - PLAN OF CARE
full recovery regular diet, regular activity, follow up 6 weeks blood pressure control follow up in office next week for BP check

## 2019-06-15 NOTE — CHART NOTE - NSCHARTNOTEFT_GEN_A_CORE
S:  Pt seen and examined for cervical change.       O:   T(C): 36.9 (08:00)  HR: 70 (08:58)  BP: 139/66 (08:54)  RR: 17 (13:39)  SpO2: 100% (08:58)  SVE: 2/80/-3, ruptured  EFM: baseline 140, mod nick + accel + occasional late decels  Merkel: irreglar      06-15    132<L>  |  97<L>  |  8   ----------------------------<  76  3.9   |  20<L>  |  0.53    Ca    8.3<L>      15 Ryan 2019 02:30  Mg     5.3     06-15    TPro  7.0  /  Alb  3.6  /  TBili  0.3  /  DBili  x   /  AST  51<H>  /  ALT  122<H>  /  AlkPhos  180<H>  06-15    Magnesium, Serum: 5.3 mg/dL (06-15-19 @ 02:30)  Creatinine, Random Urine: 47.50 mg/dL (06-14-19 @ 20:30)  Magnesium, Serum: 4.7 mg/dL (06-14-19 @ 20:30)              A/P: 24y @ 35+1 admitted for sPEC, IUGR  - HELLP labs sent at 9a  - do not recommend stadol 2/2 late decels  - pt requesting epidural, will speak w anesthesia after HELLP labs    TLal PGY2

## 2019-06-15 NOTE — DISCHARGE NOTE OB - CARE PROVIDER_API CALL
Norma Pickering (MD)  Obstetrics and Gynecology  [facility] [place] [address]  Phone: [phone]  Fax: [phone]  Follow Up Time:

## 2019-06-15 NOTE — OB NEONATOLOGY/PEDIATRICIAN DELIVERY SUMMARY - NSPEDSNEONOTESA_OBGYN_ALL_OB_FT
35.1 baby girl born via  to 24 y/o  mother, O pos blood type. 35.1 baby girl born via  to 25 y/o  mother, O pos blood type. Delivery was an induction of labor with category 2 tracings. Maternal history significant for GDMA and preeclampsia on magnesium. Prenatal history significant for IUGR and 2 doses betamethasone on  and . PNL neg/nr/imm. SROM clear fluid at 2:45 on 6/15. GBS neg on . Baby was born vigorous and crying, w/d/s/s with APGARS 8/9. EOS 0.65. Mom wants to breastfeed. Consents to Hep B. Baby required CPAP 5 at 21% FiO2 at 4 minutes of life for increased work of breathing. Saturations remained above 95% throughout resuscitation. Transferred to NICU at CPAP 5 21% for further management.

## 2019-06-15 NOTE — OB PROVIDER DELIVERY SUMMARY - NSPROVIDERDELIVERYNOTE_OBGYN_ALL_OB_FT
Patient pushed effectively to deliver viable female . To NICU for prematurity and size. First degree vaginal laceration repaired with 2-0 chromic in interrupted fashion. Placenta spontaneous and intact.

## 2019-06-15 NOTE — DISCHARGE NOTE OB - MEDICATION SUMMARY - MEDICATIONS TO TAKE
I will START or STAY ON the medications listed below when I get home from the hospital:    ibuprofen 600 mg oral tablet  -- 1 tab(s) by mouth every 6 hours  -- Indication: For vaginal delivery    acetaminophen 325 mg oral tablet  -- 3 tab(s) by mouth every 6 hours, As needed, Mild Pain (1 - 3)  -- Indication: For vaginal delivery

## 2019-06-15 NOTE — DISCHARGE NOTE OB - PATIENT PORTAL LINK FT
You can access the [facility], offered by [facility], by registering with the following website: [email or web address]

## 2019-06-15 NOTE — DISCHARGE NOTE OB - HOSPITAL COURSE
Patient presented for induction of labor at 35+ weeks gestational age due to intrauterine growth restriction and severe pre-eclampsia. Induction with prostaglandin. Uncomplicated vaginal delivery viable female . Uncomplicated postpartum course.

## 2019-06-15 NOTE — OB PROVIDER DELIVERY SUMMARY - NSLACERATCATEGORY_OBGYN_ALL_OB
Progress Notes by La Nena Villalobos CPT at 09/22/17 04:31 PM     Author:  La Nena Villalobos CPT Service:  (none) Author Type:  Technician     Filed:  09/22/17 04:32 PM Encounter Date:  9/22/2017 Status:  Signed     :  La Nena Villalobos CPT (Technician)            PLAN:[AC1.1T]  Final Order STP  1st 6 mo daily (2 90 pks/eye)[AC1.1M]    PAYMENT:[AC1.1T]  Paid in full[AC1.1M]  EYEMED[AC1.1T] (no contact lens benefits)[AC1.1M]    OTHER:[AC1.1T]  [employer] #[id number][AC1.1M]        Revision History        User Key Date/Time User Provider Type Action    > AC1.1 09/22/17 04:32 PM La Nena Villalobos CPT Technician Sign    M - Manual, T - Template             First Degree

## 2019-06-15 NOTE — CHART NOTE - NSCHARTNOTEFT_GEN_A_CORE
At bedside to place ISE as FHR has been discontinuous 2/2 body habitus.     Vital Signs Last 24 Hrs  T(C): 36.9 (15 Ryan 2019 08:00), Max: 37.1 (14 Jun 2019 13:39)  T(F): 98.42 (15 Ryan 2019 08:00), Max: 98.7 (14 Jun 2019 13:39)  HR: 66 (15 Ryan 2019 12:27) (56 - 88)  BP: 140/80 (15 Ryan 2019 12:21) (124/66 - 162/84)  BP(mean): --  RR: 17 (14 Jun 2019 13:39) (17 - 17)  SpO2: 98% (15 Ryan 2019 12:27) (91% - 100%)    FETAL HEART RATE   Baseline:  Variability: [ ] absent [ ] minimal [ x] moderate [ ] marked  Accelerations: [ ] present 15x15 or higher [ ] present 10x10 only  [ x] absent     DECELERATIONS:  [ ] Absent  [x ] Early  [ ] Variable                 [ ] Late present:  [ ] </= 2 decelerations in 30 min  [ ] more than 2 decelerations in 30 min  [ ] Prolonged: [ ] present [ ] absent    UTERINE CONTRACTIONS:  [ ] present      [ ] absent  Frequency     [ ] 3 or more in 10 minutes        [ x] less than 3 in 10 minutes    CERVICAL EXAM:  Dilation: 8.5  Effacement: 100  Station: 0    IMPRESSION:   [ x] Normal labor course   [ ] Protracted/ Arrest in active phase   [ ] Protracted/ Arrest in second stage  [ x] FHR Category I  [ ] FHR Category II [ ] FHR Category III  Additional:    INTERVENTIONS:  [ ] Start Pitocin   [ ] Increase Pitocin    [ ] Decrease Pitocin   [ ] Discontinue Pitocin  Cervical Ripening:  [ ] Start Cervidil     [ ] Stop Cervidil    [ ] Start Cytotec    [ ] Stop Cytotec    C/w Magnesium and IOL for sPEC    D/w Anita Reyes PGY2

## 2019-06-16 LAB
ALBUMIN SERPL ELPH-MCNC: 3.3 G/DL — SIGNIFICANT CHANGE UP (ref 3.3–5)
ALP SERPL-CCNC: 165 U/L — HIGH (ref 40–120)
ALT FLD-CCNC: 82 U/L — HIGH (ref 4–33)
ANION GAP SERPL CALC-SCNC: 13 MMO/L — SIGNIFICANT CHANGE UP (ref 7–14)
APTT BLD: 34.8 SEC — SIGNIFICANT CHANGE UP (ref 27.5–36.3)
AST SERPL-CCNC: 30 U/L — SIGNIFICANT CHANGE UP (ref 4–32)
BASOPHILS # BLD AUTO: 0.06 K/UL — SIGNIFICANT CHANGE UP (ref 0–0.2)
BASOPHILS NFR BLD AUTO: 0.4 % — SIGNIFICANT CHANGE UP (ref 0–2)
BILIRUB SERPL-MCNC: < 0.2 MG/DL — LOW (ref 0.2–1.2)
BUN SERPL-MCNC: 8 MG/DL — SIGNIFICANT CHANGE UP (ref 7–23)
CALCIUM SERPL-MCNC: 8.4 MG/DL — SIGNIFICANT CHANGE UP (ref 8.4–10.5)
CHLORIDE SERPL-SCNC: 101 MMOL/L — SIGNIFICANT CHANGE UP (ref 98–107)
CO2 SERPL-SCNC: 22 MMOL/L — SIGNIFICANT CHANGE UP (ref 22–31)
CREAT SERPL-MCNC: 0.49 MG/DL — LOW (ref 0.5–1.3)
EOSINOPHIL # BLD AUTO: 0.18 K/UL — SIGNIFICANT CHANGE UP (ref 0–0.5)
EOSINOPHIL NFR BLD AUTO: 1.3 % — SIGNIFICANT CHANGE UP (ref 0–6)
FIBRINOGEN PPP-MCNC: 750.1 MG/DL — HIGH (ref 350–510)
GLUCOSE SERPL-MCNC: 126 MG/DL — HIGH (ref 70–99)
HCT VFR BLD CALC: 41 % — SIGNIFICANT CHANGE UP (ref 34.5–45)
HGB BLD-MCNC: 13.3 G/DL — SIGNIFICANT CHANGE UP (ref 11.5–15.5)
IMM GRANULOCYTES NFR BLD AUTO: 0.7 % — SIGNIFICANT CHANGE UP (ref 0–1.5)
INR BLD: 0.88 — SIGNIFICANT CHANGE UP (ref 0.88–1.17)
LDH SERPL L TO P-CCNC: 232 U/L — HIGH (ref 135–225)
LYMPHOCYTES # BLD AUTO: 19.2 % — SIGNIFICANT CHANGE UP (ref 13–44)
LYMPHOCYTES # BLD AUTO: 2.56 K/UL — SIGNIFICANT CHANGE UP (ref 1–3.3)
MAGNESIUM SERPL-MCNC: 4.7 MG/DL — HIGH (ref 1.6–2.6)
MAGNESIUM SERPL-MCNC: 5.1 MG/DL — HIGH (ref 1.6–2.6)
MCHC RBC-ENTMCNC: 27.2 PG — SIGNIFICANT CHANGE UP (ref 27–34)
MCHC RBC-ENTMCNC: 32.4 % — SIGNIFICANT CHANGE UP (ref 32–36)
MCV RBC AUTO: 83.8 FL — SIGNIFICANT CHANGE UP (ref 80–100)
MONOCYTES # BLD AUTO: 0.89 K/UL — SIGNIFICANT CHANGE UP (ref 0–0.9)
MONOCYTES NFR BLD AUTO: 6.7 % — SIGNIFICANT CHANGE UP (ref 2–14)
NEUTROPHILS # BLD AUTO: 9.58 K/UL — HIGH (ref 1.8–7.4)
NEUTROPHILS NFR BLD AUTO: 71.7 % — SIGNIFICANT CHANGE UP (ref 43–77)
NRBC # FLD: 0 K/UL — SIGNIFICANT CHANGE UP (ref 0–0)
PLATELET # BLD AUTO: 233 K/UL — SIGNIFICANT CHANGE UP (ref 150–400)
PMV BLD: 11.5 FL — SIGNIFICANT CHANGE UP (ref 7–13)
POTASSIUM SERPL-MCNC: 4 MMOL/L — SIGNIFICANT CHANGE UP (ref 3.5–5.3)
POTASSIUM SERPL-SCNC: 4 MMOL/L — SIGNIFICANT CHANGE UP (ref 3.5–5.3)
PROT SERPL-MCNC: 6.9 G/DL — SIGNIFICANT CHANGE UP (ref 6–8.3)
PROTHROM AB SERPL-ACNC: 9.7 SEC — LOW (ref 9.8–13.1)
RBC # BLD: 4.89 M/UL — SIGNIFICANT CHANGE UP (ref 3.8–5.2)
RBC # FLD: 14.8 % — HIGH (ref 10.3–14.5)
SODIUM SERPL-SCNC: 136 MMOL/L — SIGNIFICANT CHANGE UP (ref 135–145)
URATE SERPL-MCNC: 7.9 MG/DL — HIGH (ref 2.5–7)
WBC # BLD: 13.36 K/UL — HIGH (ref 3.8–10.5)
WBC # FLD AUTO: 13.36 K/UL — HIGH (ref 3.8–10.5)

## 2019-06-16 RX ADMIN — AER TRAVELER 1 APPLICATION(S): 0.5 SOLUTION RECTAL; TOPICAL at 07:00

## 2019-06-16 RX ADMIN — AER TRAVELER 1 APPLICATION(S): 0.5 SOLUTION RECTAL; TOPICAL at 22:42

## 2019-06-16 RX ADMIN — Medication 1 APPLICATION(S): at 22:42

## 2019-06-16 RX ADMIN — Medication 1 APPLICATION(S): at 07:00

## 2019-06-16 RX ADMIN — Medication 1 TABLET(S): at 12:18

## 2019-06-16 RX ADMIN — Medication 50 GM/HR: at 07:05

## 2019-06-16 RX ADMIN — PRAMOXINE HYDROCHLORIDE 1 APPLICATION(S): 150 AEROSOL, FOAM RECTAL at 12:21

## 2019-06-16 RX ADMIN — SODIUM CHLORIDE 50 MILLILITER(S): 9 INJECTION, SOLUTION INTRAVENOUS at 07:06

## 2019-06-16 RX ADMIN — SODIUM CHLORIDE 3 MILLILITER(S): 9 INJECTION INTRAMUSCULAR; INTRAVENOUS; SUBCUTANEOUS at 13:30

## 2019-06-16 RX ADMIN — Medication 975 MILLIGRAM(S): at 08:55

## 2019-06-16 RX ADMIN — SODIUM CHLORIDE 3 MILLILITER(S): 9 INJECTION INTRAMUSCULAR; INTRAVENOUS; SUBCUTANEOUS at 22:41

## 2019-06-16 RX ADMIN — Medication 975 MILLIGRAM(S): at 08:23

## 2019-06-16 NOTE — PROGRESS NOTE ADULT - SUBJECTIVE AND OBJECTIVE BOX
Postpartum Progress Note  PPD#!    Subjective:   Pt seen and examined at bedside. She denies fevers, chills, headaches, blurry vision, lightheadedness or dizziness. No chest pain or SOB  She denies heavy vaginal bleeding.    Objective:  T(F): 98.2 (06-16-19 @ 04:49), Max: 99 (06-15-19 @ 20:14)  HR: 73 (06-16-19 @ 04:49) (56 - 203)  BP: 131/73 (06-16-19 @ 04:49) (131/73 - 165/92)  RR: 15 (06-16-19 @ 04:49) (15 - 20)  SpO2: 100% (06-16-19 @ 04:49) (91% - 100%)  Gen: nad  Heart: RRR  Lungs: CTAB  Abd: soft, nontender, fundus firm  : no heavy bleeding    I&O's Summary    14 Jun 2019 07:01  -  15 Ryan 2019 07:00  --------------------------------------------------------  IN: 1700 mL / OUT: 3400 mL / NET: -1700 mL    15 Ryan 2019 07:01  -  16 Jun 2019 05:15  --------------------------------------------------------  IN: 2800 mL / OUT: 5500 mL / NET: -2700 mL      CAPILLARY BLOOD GLUCOSE  POCT Blood Glucose.: 84 mg/dL (15 Ryan 2019 12:32)  POCT Blood Glucose.: 83 mg/dL (15 Ryan 2019 06:32)      MEDICATIONS  (STANDING):  acetaminophen   Tablet .. 975 milliGRAM(s) Oral every 6 hours  diphtheria/tetanus/pertussis (acellular) Vaccine (ADAcel) 0.5 milliLiter(s) IntraMuscular once  ibuprofen  Tablet. 600 milliGRAM(s) Oral every 6 hours  lactated ringers. 1000 milliLiter(s) (50 mL/Hr) IV Continuous <Continuous>  magnesium sulfate Infusion 2 Gm/Hr (50 mL/Hr) IV Continuous <Continuous>  oxytocin Infusion 16.667 milliUNIT(s)/Min (50 mL/Hr) IV Continuous <Continuous>  prenatal multivitamin 1 Tablet(s) Oral daily  sodium chloride 0.9% lock flush 3 milliLiter(s) IV Push every 8 hours  sodium chloride 0.9%. 1000 milliLiter(s) (50 mL/Hr) IV Continuous <Continuous>    MEDICATIONS  (PRN):  acetaminophen   Tablet .. 975 milliGRAM(s) Oral every 6 hours PRN Mild Pain (1 - 3)  benzocaine 20%/menthol 0.5% Spray 1 Spray(s) Topical every 6 hours PRN for Perineal discomfort  dibucaine 1% Ointment 1 Application(s) Topical every 6 hours PRN Perineal discomfort  diphenhydrAMINE 25 milliGRAM(s) Oral every 6 hours PRN Pruritus  docusate sodium 100 milliGRAM(s) Oral two times a day PRN For stool softening  glycerin Suppository - Adult 1 Suppository(s) Rectal at bedtime PRN Constipation  hydrocortisone 1% Cream 1 Application(s) Topical every 6 hours PRN Moderate Pain (4-6)  lanolin Ointment 1 Application(s) Topical every 6 hours PRN nipple soreness  magnesium hydroxide Suspension 30 milliLiter(s) Oral two times a day PRN Constipation  oxyCODONE    IR 5 milliGRAM(s) Oral every 3 hours PRN Moderate Pain (4 - 6)  oxyCODONE    IR 5 milliGRAM(s) Oral once PRN Severe Pain (7 -10)  pramoxine 1%/zinc 5% Cream 1 Application(s) Topical every 4 hours PRN Moderate Pain (4-6)  simethicone 80 milliGRAM(s) Chew every 4 hours PRN Gas  witch hazel Pads 1 Application(s) Topical every 4 hours PRN Perineal discomfort    LABS:    06-15    131<L>  |  98     |  8      ----------------------------<  90     4.1     |  21<L>  |  0.50   06-15    132<L>  |  97<L>  |  8      ----------------------------<  76     3.9     |  20<L>  |  0.53   06-14    135    |  101    |  8      ----------------------------<  73     4.4     |  17<L>  |  0.49<L>    Ca    8.1<L>      15 Ryan 2019 08:40  Ca    8.3<L>      15 Ryan 2019 02:30  Ca    9.0        14 Jun 2019 20:30  Mg     4.7       06-16  Mg     4.7       06-15  Mg     5.6       06-15  Mg     5.6       06-15  Mg     5.3       06-15  Mg     4.7       06-14    TPro  6.9    /  Alb  3.4    /  TBili  0.3    /  DBili  x      /  AST  54<H>  /  ALT  121<H>  /  AlkPhos  186<H>  06-15  TPro  7.0    /  Alb  3.6    /  TBili  0.3    /  DBili  x      /  AST  51<H>  /  ALT  122<H>  /  AlkPhos  180<H>  06-15  TPro  6.9    /  Alb  3.3    /  TBili  0.3    /  DBili  x      /  AST  48<H>  /  ALT  119<H>  /  AlkPhos  185<H>  06-14        PT/INR - ( 15 Ryan 2019 08:40 )   PT: 10.0 SEC;   INR: 0.90          PTT - ( 15 Ryan 2019 02:30 )  PTT:32.1 SEC

## 2019-06-16 NOTE — PROGRESS NOTE ADULT - PROBLEM SELECTOR PLAN 1
- continue Magnesium for 24hrs postpartum. Monitor for sx of Mag toxicity  - monitor for sx of preeclampsia  - monitor BPs. For antihypertensives if mostly >150/100  - analgesia prn  - routine postpartum care    S Fred, R3

## 2019-06-16 NOTE — PROGRESS NOTE ADULT - SUBJECTIVE AND OBJECTIVE BOX
POD#1 S/P Vaginal Delivery with epidural anesthesia    Patient is doing well.  OOBAA. Tolerating clears.  Pain is tolerable.  No residual anesthetic issues or complications noted.    Tiffany Chu CRNA

## 2019-06-17 VITALS
OXYGEN SATURATION: 100 % | HEART RATE: 83 BPM | SYSTOLIC BLOOD PRESSURE: 144 MMHG | DIASTOLIC BLOOD PRESSURE: 71 MMHG | TEMPERATURE: 98 F | RESPIRATION RATE: 18 BRPM

## 2019-06-17 DIAGNOSIS — O24.419 GESTATIONAL DIABETES MELLITUS IN PREGNANCY, UNSPECIFIED CONTROL: ICD-10-CM

## 2019-06-17 DIAGNOSIS — O14.10 SEVERE PRE-ECLAMPSIA, UNSPECIFIED TRIMESTER: ICD-10-CM

## 2019-06-17 DIAGNOSIS — O41.03X0 OLIGOHYDRAMNIOS, THIRD TRIMESTER, NOT APPLICABLE OR UNSPECIFIED: ICD-10-CM

## 2019-06-17 DIAGNOSIS — O99.213 OBESITY COMPLICATING PREGNANCY, THIRD TRIMESTER: ICD-10-CM

## 2019-06-17 DIAGNOSIS — Z3A.33 33 WEEKS GESTATION OF PREGNANCY: ICD-10-CM

## 2019-06-17 DIAGNOSIS — O36.5930 MATERNAL CARE FOR OTHER KNOWN OR SUSPECTED POOR FETAL GROWTH, THIRD TRIMESTER, NOT APPLICABLE OR UNSPECIFIED: ICD-10-CM

## 2019-06-17 RX ADMIN — Medication 1 TABLET(S): at 12:50

## 2019-06-17 RX ADMIN — SODIUM CHLORIDE 3 MILLILITER(S): 9 INJECTION INTRAMUSCULAR; INTRAVENOUS; SUBCUTANEOUS at 06:03

## 2019-06-17 NOTE — PROGRESS NOTE ADULT - PROBLEM SELECTOR PLAN 1
- Pain well controlled, continue current pain regimen  - Increase ambulation, SCDs when not ambulating  - Continue regular diet  - Discharge planning   -Breast Feeding    [place] PGY-1

## 2019-06-17 NOTE — PROGRESS NOTE ADULT - SUBJECTIVE AND OBJECTIVE BOX
OB Progress Note:  PPD#2    S: 23yo PPD#2 s/p  c/b sPEC s/p Mg with downtrending AST/ALT. BP well controlled. Patient feels well. Pain is well controlled. She is tolerating a regular diet and passing flatus. She is voiding spontaneously, and ambulating without difficulty. Denies CP/SOB. Denies lightheadedness/dizziness. Denies N/V. Denies heavy vaginal bleeding.    O:  Vitals:   Vital Signs Last 24 Hrs  T(C): 36.9 (2019 05:45), Max: 37.1 (2019 01:55)  T(F): 98.5 (2019 05:45), Max: 98.7 (2019 01:55)  HR: 79 (2019 05:45) (70 - 87)  BP: 120/73 (2019 05:45) (117/58 - 145/70)  BP(mean): --  RR: 20 (2019 05:45) (16 - 20)  SpO2: 100% (2019 05:45) (98% - 100%)    MEDICATIONS  (STANDING):  acetaminophen   Tablet .. 975 milliGRAM(s) Oral every 6 hours  diphtheria/tetanus/pertussis (acellular) Vaccine (ADAcel) 0.5 milliLiter(s) IntraMuscular once  ibuprofen  Tablet. 600 milliGRAM(s) Oral every 6 hours  oxytocin Infusion 16.667 milliUNIT(s)/Min (50 mL/Hr) IV Continuous <Continuous>  prenatal multivitamin 1 Tablet(s) Oral daily  sodium chloride 0.9% lock flush 3 milliLiter(s) IV Push every 8 hours    MEDICATIONS  (PRN):  acetaminophen   Tablet .. 975 milliGRAM(s) Oral every 6 hours PRN Mild Pain (1 - 3)  benzocaine 20%/menthol 0.5% Spray 1 Spray(s) Topical every 6 hours PRN for Perineal discomfort  dibucaine 1% Ointment 1 Application(s) Topical every 6 hours PRN Perineal discomfort  diphenhydrAMINE 25 milliGRAM(s) Oral every 6 hours PRN Pruritus  docusate sodium 100 milliGRAM(s) Oral two times a day PRN For stool softening  glycerin Suppository - Adult 1 Suppository(s) Rectal at bedtime PRN Constipation  hydrocortisone 1% Cream 1 Application(s) Topical every 6 hours PRN Moderate Pain (4-6)  lanolin Ointment 1 Application(s) Topical every 6 hours PRN nipple soreness  magnesium hydroxide Suspension 30 milliLiter(s) Oral two times a day PRN Constipation  oxyCODONE    IR 5 milliGRAM(s) Oral every 3 hours PRN Moderate Pain (4 - 6)  oxyCODONE    IR 5 milliGRAM(s) Oral once PRN Severe Pain (7 -10)  pramoxine 1%/zinc 5% Cream 1 Application(s) Topical every 4 hours PRN Moderate Pain (4-6)  simethicone 80 milliGRAM(s) Chew every 4 hours PRN Gas  witch hazel Pads 1 Application(s) Topical every 4 hours PRN Perineal discomfort      Labs:  Blood type: O Positive  Rubella IgG: RPR: Negative                          13.3   13.36<H> >-----------< 233    (  @ 10:50 )             41.0                        13.3   12.17<H> >-----------< 240    ( 06-15 @ 08:40 )             41.8                        13.3   11.41<H> >-----------< 238    ( 06-15 @ 02:30 )             41.6                        13.4   12.08<H> >-----------< 253    (  @ 20:30 )             41.5                        13.5   11.80<H> >-----------< 262    (  @ 14:25 )             41.4    19 @ 10:50      136  |  101  |  8   ----------------------------<  126<H>  4.0   |  22  |  0.49<L>    06-15-19 @ 08:40      131<L>  |  98  |  8   ----------------------------<  90  4.1   |  21<L>  |  0.50    06-15-19 @ 02:30      132<L>  |  97<L>  |  8   ----------------------------<  76  3.9   |  20<L>  |  0.53    19 @ 20:30      135  |  101  |  8   ----------------------------<  73  4.4   |  17<L>  |  0.49<L>    19 @ 14:25      135  |  102  |  10  ----------------------------<  74  4.6   |  19<L>  |  0.50        Ca    8.4      2019 10:50  Ca    8.1<L>      15 Ryan 2019 08:40  Ca    8.3<L>      15 Ryna 2019 02:30  Ca    9.0      2019 20:30  Ca    9.9      2019 14:25  Mg     5.1<H>     06-16  Mg     4.7<H>     06-16  Mg     4.7<H>     06-15  Mg     5.6<H>     06-15  Mg     5.6<H>     06-15  Mg     5.3<H>     06-15  Mg     4.7<H>         TPro  6.9  /  Alb  3.3  /  TBili  < 0.2<L>  /  DBili  x   /  AST  30  /  ALT  82<H>  /  AlkPhos  165<H>  19 @ 10:50  TPro  6.9  /  Alb  3.4  /  TBili  0.3  /  DBili  x   /  AST  54<H>  /  ALT  121<H>  /  AlkPhos  186<H>  06-15-19 @ 08:40  TPro  7.0  /  Alb  3.6  /  TBili  0.3  /  DBili  x   /  AST  51<H>  /  ALT  122<H>  /  AlkPhos  180<H>  06-15-19 @ 02:30  TPro  6.9  /  Alb  3.3  /  TBili  0.3  /  DBili  x   /  AST  48<H>  /  ALT  119<H>  /  AlkPhos  185<H>  19 @ 20:30  TPro  6.8  /  Alb  3.4  /  TBili  0.2  /  DBili  x   /  AST  46<H>  /  ALT  120<H>  /  AlkPhos  183<H>  19 @ 14:25          Physical Exam:  General: NAD  Abdomen: soft, non-tender, non-distended, fundus firm  Vaginal: Lochia wnl  Extremities: No erythema/edema

## 2019-06-17 NOTE — PROGRESS NOTE ADULT - ASSESSMENT
A/P:23yo PPD#2 s/p  c/b sPEC s/p Mg with downtrending AST/ALT. BP well controlled. .  Patient is stable and doing well post-partum.

## 2019-06-17 NOTE — PROGRESS NOTE ADULT - ATTENDING COMMENTS
saw and examined patient this morning. Was doing well on magnesium and had no pEC symptoms. Explained most likely to be discharged tomorrow depending on BP
Patient seen and examined by me.  Agree with above assessment and plan  D/c abbie today  BP check in 4days in office

## 2019-06-18 ENCOUNTER — APPOINTMENT (OUTPATIENT)
Dept: ANTEPARTUM | Facility: CLINIC | Age: 24
End: 2019-06-18

## 2019-06-20 ENCOUNTER — APPOINTMENT (OUTPATIENT)
Dept: OBGYN | Facility: CLINIC | Age: 24
End: 2019-06-20

## 2019-06-21 ENCOUNTER — APPOINTMENT (OUTPATIENT)
Dept: ANTEPARTUM | Facility: CLINIC | Age: 24
End: 2019-06-21

## 2019-06-21 DIAGNOSIS — O24.419 GESTATIONAL DIABETES MELLITUS IN PREGNANCY, UNSPECIFIED CONTROL: ICD-10-CM

## 2019-06-21 DIAGNOSIS — O36.5930 MATERNAL CARE FOR OTHER KNOWN OR SUSPECTED POOR FETAL GROWTH, THIRD TRIMESTER, NOT APPLICABLE OR UNSPECIFIED: ICD-10-CM

## 2019-06-21 DIAGNOSIS — O41.93X0 DISORDER OF AMNIOTIC FLUID AND MEMBRANES, UNSPECIFIED, THIRD TRIMESTER, NOT APPLICABLE OR UNSPECIFIED: ICD-10-CM

## 2019-06-21 LAB — SURGICAL PATHOLOGY STUDY: SIGNIFICANT CHANGE UP

## 2019-06-26 DIAGNOSIS — O41.03X0 OLIGOHYDRAMNIOS, THIRD TRIMESTER, NOT APPLICABLE OR UNSPECIFIED: ICD-10-CM

## 2019-06-26 DIAGNOSIS — O36.5930 MATERNAL CARE FOR OTHER KNOWN OR SUSPECTED POOR FETAL GROWTH, THIRD TRIMESTER, NOT APPLICABLE OR UNSPECIFIED: ICD-10-CM

## 2019-06-26 DIAGNOSIS — O99.213 OBESITY COMPLICATING PREGNANCY, THIRD TRIMESTER: ICD-10-CM

## 2019-06-26 DIAGNOSIS — Z3A.33 33 WEEKS GESTATION OF PREGNANCY: ICD-10-CM

## 2019-06-26 DIAGNOSIS — O24.419 GESTATIONAL DIABETES MELLITUS IN PREGNANCY, UNSPECIFIED CONTROL: ICD-10-CM

## 2019-06-27 ENCOUNTER — APPOINTMENT (OUTPATIENT)
Dept: OBGYN | Facility: CLINIC | Age: 24
End: 2019-06-27

## 2019-07-02 ENCOUNTER — APPOINTMENT (OUTPATIENT)
Dept: OBGYN | Facility: CLINIC | Age: 24
End: 2019-07-02

## 2019-07-03 ENCOUNTER — APPOINTMENT (OUTPATIENT)
Dept: OBGYN | Facility: CLINIC | Age: 24
End: 2019-07-03

## 2019-07-10 ENCOUNTER — APPOINTMENT (OUTPATIENT)
Dept: OBGYN | Facility: CLINIC | Age: 24
End: 2019-07-10

## 2019-07-11 ENCOUNTER — NON-APPOINTMENT (OUTPATIENT)
Age: 24
End: 2019-07-11

## 2019-07-11 LAB
ALBUMIN SERPL ELPH-MCNC: 3.5 G/DL
ALBUMIN SERPL ELPH-MCNC: 3.5 G/DL
ALP BLD-CCNC: 116 U/L
ALP BLD-CCNC: 181 U/L
ALT SERPL-CCNC: 15 U/L
ALT SERPL-CCNC: 165 U/L
ANION GAP SERPL CALC-SCNC: 14 MMOL/L
ANION GAP SERPL CALC-SCNC: 14 MMOL/L
AST SERPL-CCNC: 15 U/L
AST SERPL-CCNC: 56 U/L
BASOPHILS # BLD AUTO: 0.04 K/UL
BASOPHILS # BLD AUTO: 0.07 K/UL
BASOPHILS NFR BLD AUTO: 0.4 %
BASOPHILS NFR BLD AUTO: 0.6 %
BILIRUB SERPL-MCNC: 0.2 MG/DL
BILIRUB SERPL-MCNC: <0.2 MG/DL
BUN SERPL-MCNC: 11 MG/DL
BUN SERPL-MCNC: 7 MG/DL
CALCIUM SERPL-MCNC: 8.7 MG/DL
CALCIUM SERPL-MCNC: 9.6 MG/DL
CHLORIDE SERPL-SCNC: 102 MMOL/L
CHLORIDE SERPL-SCNC: 104 MMOL/L
CO2 SERPL-SCNC: 19 MMOL/L
CO2 SERPL-SCNC: 19 MMOL/L
CREAT 24H UR-MCNC: 2 G/24 H
CREAT 24H UR-MCNC: 2 G/24 H
CREAT ?TM UR-MCNC: 142 MG/DL
CREAT ?TM UR-MCNC: 142 MG/DL
CREAT SERPL-MCNC: 0.43 MG/DL
CREAT SERPL-MCNC: 0.61 MG/DL
EOSINOPHIL # BLD AUTO: 0.18 K/UL
EOSINOPHIL # BLD AUTO: 0.21 K/UL
EOSINOPHIL NFR BLD AUTO: 1.6 %
EOSINOPHIL NFR BLD AUTO: 1.9 %
GLUCOSE 1H P 100 G GLC PO SERPL-MCNC: 206 MG/DL
GLUCOSE 1H P 50 G GLC PO SERPL-MCNC: 186 MG/DL
GLUCOSE 2H P CHAL SERPL-MCNC: 179 MG/DL
GLUCOSE 3H P CHAL SERPL-MCNC: 129 MG/DL
GLUCOSE BS SERPL-MCNC: 93 MG/DL
GLUCOSE SERPL-MCNC: 108 MG/DL
GLUCOSE SERPL-MCNC: 184 MG/DL
HCT VFR BLD CALC: 37.3 %
HCT VFR BLD CALC: 40.5 %
HGB BLD-MCNC: 11.8 G/DL
HGB BLD-MCNC: 13.3 G/DL
IMM GRANULOCYTES NFR BLD AUTO: 0.5 %
IMM GRANULOCYTES NFR BLD AUTO: 1.2 %
LYMPHOCYTES # BLD AUTO: 2.15 K/UL
LYMPHOCYTES # BLD AUTO: 2.86 K/UL
LYMPHOCYTES NFR BLD AUTO: 19.9 %
LYMPHOCYTES NFR BLD AUTO: 24.7 %
MAN DIFF?: NORMAL
MAN DIFF?: NORMAL
MCHC RBC-ENTMCNC: 27.4 PG
MCHC RBC-ENTMCNC: 27.5 PG
MCHC RBC-ENTMCNC: 31.6 GM/DL
MCHC RBC-ENTMCNC: 32.8 GM/DL
MCV RBC AUTO: 83.9 FL
MCV RBC AUTO: 86.7 FL
MEV IGG FLD QL IA: <5 AU/ML
MEV IGG FLD QL IA: <5 AU/ML
MEV IGG+IGM SER-IMP: NEGATIVE
MEV IGG+IGM SER-IMP: NEGATIVE
MONOCYTES # BLD AUTO: 0.54 K/UL
MONOCYTES # BLD AUTO: 0.86 K/UL
MONOCYTES NFR BLD AUTO: 5 %
MONOCYTES NFR BLD AUTO: 7.4 %
NEUTROPHILS # BLD AUTO: 7.49 K/UL
NEUTROPHILS # BLD AUTO: 7.84 K/UL
NEUTROPHILS NFR BLD AUTO: 64.5 %
NEUTROPHILS NFR BLD AUTO: 72.3 %
PLATELET # BLD AUTO: 258 K/UL
PLATELET # BLD AUTO: 259 K/UL
POTASSIUM SERPL-SCNC: 4.2 MMOL/L
POTASSIUM SERPL-SCNC: 4.5 MMOL/L
PROT 24H UR-MRATE: 15 MG/DL
PROT ?TM UR-MCNC: 24 HR
PROT ?TM UR-MCNC: 24 HR
PROT SERPL-MCNC: 6 G/DL
PROT SERPL-MCNC: 6.4 G/DL
PROT UR-MCNC: 210 MG/24 H
RBC # BLD: 4.3 M/UL
RBC # BLD: 4.83 M/UL
RBC # FLD: 14.4 %
RBC # FLD: 14.8 %
SODIUM SERPL-SCNC: 135 MMOL/L
SODIUM SERPL-SCNC: 137 MMOL/L
SPECIMEN VOL 24H UR: 1400 ML
SPECIMEN VOL 24H UR: 1400 ML
T PALLIDUM AB SER QL IA: NEGATIVE
URATE SERPL-MCNC: 8.5 MG/DL
WBC # FLD AUTO: 10.83 K/UL
WBC # FLD AUTO: 11.6 K/UL

## 2019-07-17 ENCOUNTER — APPOINTMENT (OUTPATIENT)
Dept: OBGYN | Facility: CLINIC | Age: 24
End: 2019-07-17

## 2019-07-25 ENCOUNTER — APPOINTMENT (OUTPATIENT)
Dept: OBGYN | Facility: CLINIC | Age: 24
End: 2019-07-25

## 2019-08-07 ENCOUNTER — APPOINTMENT (OUTPATIENT)
Dept: OBGYN | Facility: CLINIC | Age: 24
End: 2019-08-07
Payer: COMMERCIAL

## 2019-08-07 VITALS
SYSTOLIC BLOOD PRESSURE: 133 MMHG | BODY MASS INDEX: 40.32 KG/M2 | DIASTOLIC BLOOD PRESSURE: 86 MMHG | WEIGHT: 242 LBS | HEIGHT: 65 IN | HEART RATE: 71 BPM

## 2019-08-07 DIAGNOSIS — Z32.01 ENCOUNTER FOR PREGNANCY TEST, RESULT POSITIVE: ICD-10-CM

## 2019-08-07 DIAGNOSIS — O24.410 GESTATIONAL DIABETES MELLITUS IN PREGNANCY, DIET CONTROLLED: ICD-10-CM

## 2019-08-07 DIAGNOSIS — Z87.59 PERSONAL HISTORY OF OTHER COMPLICATIONS OF PREGNANCY, CHILDBIRTH AND THE PUERPERIUM: ICD-10-CM

## 2019-08-07 DIAGNOSIS — Z34.03 ENCOUNTER FOR SUPERVISION OF NORMAL FIRST PREGNANCY, THIRD TRIMESTER: ICD-10-CM

## 2019-08-07 DIAGNOSIS — O11.9 PRE-EXISTING HYPERTENSION WITH PRE-ECLAMPSIA, UNSPECIFIED TRIMESTER: ICD-10-CM

## 2019-08-07 DIAGNOSIS — Z34.02 ENCOUNTER FOR SUPERVISION OF NORMAL FIRST PREGNANCY, SECOND TRIMESTER: ICD-10-CM

## 2019-08-07 DIAGNOSIS — Z34.00 ENCOUNTER FOR SUPERVISION OF NORMAL FIRST PREGNANCY, UNSPECIFIED TRIMESTER: ICD-10-CM

## 2019-08-07 DIAGNOSIS — O10.013 PRE-EXISTING ESSENTIAL HYPERTENSION COMPLICATING PREGNANCY, THIRD TRIMESTER: ICD-10-CM

## 2019-08-07 DIAGNOSIS — O10.019 PRE-EXISTING HYPERTENSION WITH PRE-ECLAMPSIA, UNSPECIFIED TRIMESTER: ICD-10-CM

## 2019-08-07 DIAGNOSIS — Z86.32 PERSONAL HISTORY OF GESTATIONAL DIABETES: ICD-10-CM

## 2019-08-07 DIAGNOSIS — I10 ESSENTIAL (PRIMARY) HYPERTENSION: ICD-10-CM

## 2019-08-07 PROCEDURE — 0503F POSTPARTUM CARE VISIT: CPT

## 2019-08-07 NOTE — HISTORY OF PRESENT ILLNESS
[Postpartum Follow Up] : postpartum follow up [Complications:___] : complications include: [unfilled] [Delivery Date: ___] : on [unfilled] [] : delivered by vaginal delivery [Girl] : baby is a girl [Female] : Delivery History: baby girl [Infant's Name ___] : [unfilled] [___ Lbs] : [unfilled] lbs [___ Oz] : [unfilled] oz [Living at Home] : is currently living at home [Bottle Feeding] : bottle feeding [Breastfeeding] : currently nursing [Intended Contraception] : Intended Contraception: [Resumed [place]] : has resumed intercourse [None] : No associated symptoms are reported [Awake] : awake [Alert] : alert [Soft] : soft [Oriented x3] : oriented to person, place, and time [No Lesions] : no genitalia lesions [Normal] : cervix [Labia Majora] : labia major [Labia Minora] : labia minora [Pink Rugae] : pink rugae [No Bleeding] : there was no active vaginal bleeding [No Sign of Infection] : is showing no signs of infection [Doing Well] : is doing well [Excellent Pain Control] : has excellent pain control [Resumed Menses] : has not resumed her menses [Acute Distress] : no acute distress [Mass] : no breast mass [Nipple Discharge] : no nipple discharge [Axillary LAD] : no axillary lymphadenopathy [Distended] : not distended [Tender] : non tender [Flat Affect] : affect not flat [Depressed Mood] : not depressed [Tenderness] : nontender [Motion Tenderness] : there was no cervical motion tenderness [Adnexa Tenderness] : were not tender [de-identified] : Stable PPV; hx of chronic HTN with superimposed preeclampsia; Hx of GDM [FreeTextEntry8] : This 23 yo P 0111 presents post induction of labor at 35 weeks due to severe pre-eclampsia and IUGR, for PPV; feels well, has resumed sexual activity and took Plan B the following day; voiding and stooling without issue.  Desires OCP. [de-identified] : Will schedule 2 HR GTT; will schedule appointment with cardiologist- resource name in hand; Kegel exercises reviewed; will start Adore- consistent PO intake/condoms stressed; RTO 3 months annual

## 2020-06-04 NOTE — PROGRESS NOTE ADULT - ASSESSMENT
----- Message from Yolanda Cueva sent at 6/4/2020  8:15 AM CDT -----  Contact: [facility]  Reason: Calling to speak with staff pertaining to orders for mutual pt.    Communication: [phone]  [facility] ext [phone]   25yo  PPD#1 s/p  c/b preeclampsia with severe features  BPs normal to mild range overnight. Labs significant for transaminitis, stable postpartum

## 2020-09-29 ENCOUNTER — APPOINTMENT (OUTPATIENT)
Dept: INTERNAL MEDICINE | Facility: CLINIC | Age: 25
End: 2020-09-29
Payer: COMMERCIAL

## 2020-09-29 VITALS — DIASTOLIC BLOOD PRESSURE: 82 MMHG | SYSTOLIC BLOOD PRESSURE: 130 MMHG

## 2020-09-29 VITALS
HEART RATE: 82 BPM | BODY MASS INDEX: 43.02 KG/M2 | TEMPERATURE: 98.7 F | WEIGHT: 252 LBS | OXYGEN SATURATION: 97 % | HEIGHT: 64 IN | DIASTOLIC BLOOD PRESSURE: 78 MMHG | SYSTOLIC BLOOD PRESSURE: 121 MMHG

## 2020-09-29 DIAGNOSIS — E66.01 MORBID (SEVERE) OBESITY DUE TO EXCESS CALORIES: ICD-10-CM

## 2020-09-29 DIAGNOSIS — R42 DIZZINESS AND GIDDINESS: ICD-10-CM

## 2020-09-29 DIAGNOSIS — Z87.09 PERSONAL HISTORY OF OTHER DISEASES OF THE RESPIRATORY SYSTEM: ICD-10-CM

## 2020-09-29 DIAGNOSIS — R51 HEADACHE: ICD-10-CM

## 2020-09-29 DIAGNOSIS — J30.9 ALLERGIC RHINITIS, UNSPECIFIED: ICD-10-CM

## 2020-09-29 DIAGNOSIS — Z23 ENCOUNTER FOR IMMUNIZATION: ICD-10-CM

## 2020-09-29 DIAGNOSIS — Z00.00 ENCOUNTER FOR GENERAL ADULT MEDICAL EXAMINATION W/OUT ABNORMAL FINDINGS: ICD-10-CM

## 2020-09-29 PROCEDURE — G0008: CPT

## 2020-09-29 PROCEDURE — 90686 IIV4 VACC NO PRSV 0.5 ML IM: CPT

## 2020-09-29 PROCEDURE — 99204 OFFICE O/P NEW MOD 45 MIN: CPT | Mod: 25

## 2020-09-29 RX ORDER — ETHINYL ESTRADIOL AND LEVONORGESTREL 150-30(84)
0.15-0.03 &0.01 KIT ORAL DAILY
Refills: 0 | Status: ACTIVE | COMMUNITY
Start: 2020-09-29

## 2020-09-29 RX ORDER — NORETHINDRONE 0.35 MG/1
0.35 TABLET ORAL DAILY
Qty: 28 | Refills: 4 | Status: COMPLETED | COMMUNITY
Start: 2019-08-07 | End: 2020-09-29

## 2020-09-29 RX ORDER — PNV NO.95/FERROUS FUM/FOLIC AC 28MG-0.8MG
TABLET ORAL
Refills: 0 | Status: COMPLETED | COMMUNITY
End: 2020-09-29

## 2020-09-29 RX ORDER — AMOXICILLIN AND CLAVULANATE POTASSIUM 875; 125 MG/1; MG/1
875-125 TABLET, COATED ORAL
Qty: 14 | Refills: 0 | Status: ACTIVE | COMMUNITY
Start: 2020-09-29 | End: 1900-01-01

## 2020-09-29 NOTE — END OF VISIT
Sounds like a mild irritation of eye or very slight (likely viral) infection - so I would just use moisturizing drops several times a day; this should self resolve; if eye PAIN or significant drainage develops - then see us [Time Spent: ___ minutes] : I have spent [unfilled] minutes of time on the encounter. [>50% of the face to face encounter time was spent on counseling and/or coordination of care for ___] : Greater than 50% of the face to face encounter time was spent on counseling and/or coordination of care for [unfilled]

## 2020-09-29 NOTE — ASSESSMENT
[FreeTextEntry1] : Patient presented with several weeks of severe headache behind her eyes that was throbbing associated with occasional dizziness when she had sudden movement as well as some mild nausea.  Her symptoms were pretty bad at first but was actually slightly less but now.  Exam was consistent with ethmoid sinusitis.  I will treat her with a course of Augmentin for 7 days along with Claritin.  Patient was also advised to increase fluid intake.  Vertigo symptoms were mild, I will hold off on any treatment at present.  Patient returns for follow-up if her symptoms do not improve with the above treatment.\par \par She was also advised to return for a physical exam with blood test prior to her visit when she recovers from the sinus infection.

## 2020-09-29 NOTE — PHYSICAL EXAM
[No Acute Distress] : no acute distress [Well Nourished] : well nourished [Well Developed] : well developed [Normal Sclera/Conjunctiva] : normal sclera/conjunctiva [PERRL] : pupils equal round and reactive to light [EOMI] : extraocular movements intact [Normal Oropharynx] : the oropharynx was normal [Normal TMs] : both tympanic membranes were normal [No JVD] : no jugular venous distention [Supple] : supple [No Respiratory Distress] : no respiratory distress  [Clear to Auscultation] : lungs were clear to auscultation bilaterally [Normal Rate] : normal rate  [Regular Rhythm] : with a regular rhythm [Normal S1, S2] : normal S1 and S2 [No Edema] : there was no peripheral edema [No Extremity Clubbing/Cyanosis] : no extremity clubbing/cyanosis [Soft] : abdomen soft [Non Tender] : non-tender [Normal Bowel Sounds] : normal bowel sounds [Normal Supraclavicular Nodes] : no supraclavicular lymphadenopathy [Normal Posterior Cervical Nodes] : no posterior cervical lymphadenopathy [Cervical Lymph Nodes Enlarged Anterior Bilaterally] : enlarged nodes bilaterally [No CVA Tenderness] : no CVA  tenderness [No Spinal Tenderness] : no spinal tenderness [No Joint Swelling] : no joint swelling [Grossly Normal Strength/Tone] : grossly normal strength/tone [Coordination Grossly Intact] : coordination grossly intact [No Focal Deficits] : no focal deficits [Normal Gait] : normal gait [Speech Grossly Normal] : speech grossly normal [Normal Affect] : the affect was normal [Alert and Oriented x3] : oriented to person, place, and time [Normal Mood] : the mood was normal [de-identified] : Obese young female, [de-identified] : b/l ethmoid sinus were tender, nasal turbinates were congested,  [de-identified] : b/l anterior cervical LN were enlarged and tender, [de-identified] : Vertigo was elicited when patient had a change of position, but not with head or eye movement,

## 2020-09-29 NOTE — HISTORY OF PRESENT ILLNESS
[FreeTextEntry8] : Patient presented for the first time for transition of care, she's looking for a new PCP.  She does not have a PCP.\par \par She has not been feeling well for about 3 weeks.  She has HA, dizziness and nausea.  She has it everyday for 3 weeks. HA are pain behind the eyes.  It was worse at first when her pain was constant, she was using ice pack and the occ Ibuprofen.  Now the pain is still every day, but last only 1/2 day.   She is trying to drink more water and using her phone less.   The pain was a throbbing pain, and she was sensitive to light.  Her balance was off if she did a sharp turn.  No nasal congestion.  She had a cough the 1st week, no phlegm, the cough has resolved. No change of vision or hearing. Pain was 9/10 at it's worse, it's a 7/10 now.\par \par Pt would like to have flu vaccine today.

## 2020-12-23 PROBLEM — Z87.09 HISTORY OF ACUTE SINUSITIS: Status: RESOLVED | Noted: 2020-09-29 | Resolved: 2020-12-23

## 2021-09-01 NOTE — OB RN PATIENT PROFILE - AGENT'S NAME
Detail Level: Detailed Detail Level: Simple Detail Level: Zone Detail Level: Generalized Kenia Hirsch

## 2022-02-08 NOTE — OB RN PATIENT PROFILE - HEART RATE (BEATS/MIN)
OFFICE NOTE    Patient has given consent to record this visit for documentation in their clinical record.    HISTORY OF PRESENT ILLNESS  Melanie Bajwa is a 75 year old old female that presents for the following complaint(s):    Chief Complaint   Patient presents with   • Follow-up     had covid october 9th- admitted to hospital october 21th- having some troubles walking/with confusion   • Foot     ok to do foot exam     Type 2 diabetes mellitus with diabetic nephropathy, without long-term current use of insulin (CMS/[facility]):  She presents for repeat evaluation of diabetes.  She was last seen for this about 3 months ago by me.  Present treatment includes oral medication.  There has been no change in the present treatment regimen.  She is currently compliant with her treatment plan.  There are no problems with the medication or any associated side effects. Recent HbA1C improved to 5.8% and it was 6.3% in her last visit 3 months ago.  She does not have symptoms of hypoglycemia or hyperglycemia.  Associated symptoms include: none. Due for diabetic foot exam.    Need for COVID-19 vaccine: Due for booster dose of COVID-19 vaccine.    Additional Comments:  Up-to-date with refills of current medications.  She was diagnosed with COVID-19 infection on 10/09/2021, and then she was admitted to hospital on 10/21/2021.   Has been having some troubles in walking lately. Reports walking with confusion as well. She did experience shortness of breath while walking. Denies dizziness while walking.   Is on Diltiazem 240 mg daily and Metoprolol 100 mg twice daily. Denies any leg swelling. Lab reports showed that magnesium level is within normal range. No skipped heartbeats or lightheadedness.  Has brought the readings of weight, heart rates in the morning, and sodium intake recorded at home. Reports experiencing fluctuations in weight. She gained 4 pounds of weight in the last week. Her average weight is within the range of 142-143 lb  over the last 4 weeks. Wants to reduce weight. Sodium intake tracking has been sporadic, but generally within range. Heart rates in the morning have been fluctuant, but mostly between 107-120 beats per minute for the general average. Heart rate this morning is 130 beats per minute, but this is likely post dehydration, after medication effects wearing off the day before, potato chips, dip, and hot dog last night.    Inquires about longevity of endovascular valve replacement.  Echocardiogram was done in October 2021. Requests for a copy of that.      Current Outpatient Medications   Medication Sig   • dilTIAZem (CARDIZEM CD) 240 MG 24 hr capsule    • TURMERIC CURCUMIN PO    • APPLE CIDER VINEGAR PO    • metoPROLOL tartrate (LOPRESSOR) 100 MG tablet Take 1 tablet by mouth 2 times daily.   • apixaBAN (ELIQUIS) 5 MG Tab Take 1 tablet by mouth 2 times daily. 1 tab twice daily.   • atorvastatin (LIPITOR) 40 MG tablet Take 1 tablet by mouth daily.   • buPROPion XL (WELLBUTRIN XL) 300 MG 24 hr tablet Take 1 tablet by mouth daily.   • Glucosamine HCl 1500 MG Tab Take 1,500 mg by mouth daily.   • anastrozole (ARIMIDEX) 1 MG tablet TAKE 1 TABLET DAILY.       PRIMARY CARE PHYSICIAN AS  DR. PRYOR   • losartan (COZAAR) 100 MG tablet TAKE 1 TABLET DAILY   • amLODIPine (NORVASC) 10 MG tablet TAKE 1 TABLET DAILY   • Multiple Vitamin (Multi Vitamin) Tab 1 (one) time each day at the same time.   • aspirin 325 MG EC tablet Take 1 tablet by mouth daily. STOP 1 WEEK PRIOR TO SURGERY   • Omega-3 Fatty Acids (Fish Oil) 1000 MG capsule Take 4 g by mouth daily. STOP 1 WEEK PRIOR TO SURGERY   • zolpidem (Ambien) 5 MG tablet Take 1 tablet by mouth nightly as needed for Sleep.   • fexofenadine (ALLEGRA) 180 MG tablet Take 180 mg by mouth daily as needed.     No current facility-administered medications for this visit.       Known diabetic complications: nephropathy  Cardiovascular risk factors:   The 10-year ASCVD risk score (Macon NADIA Anderson., et  al., 2013) is: 35%    Values used to calculate the score:      Age: 75 years      Sex: Female      Is Non- : No      Diabetic: Yes      Tobacco smoker: No      Systolic Blood Pressure: 118 mmHg      Is BP treated: Yes      HDL Cholesterol: 56 mg/dL      Total Cholesterol: 158 mg/dL    Eye exam current (within one year): NO  Weight trend: fluctuating a bit  Current diet: in general, a \"healthy\" diet  , well balanced, diabetic, low fat/ cholesterol, low salt  Current exercise: aerobics and walking    Current monitoring regimen: home blood tests - 2-3 times/week and office lab tests - q 6 months    SUGAR MANAGEMENT:    Hemoglobin A1C (%)   Date Value   02/01/2022 5.8 (H)   11/03/2021 6.3 (H)   10/22/2021 6.3 (H)         REVIEW OF SYSTEMS  Constitutional: Per HPI.  Respiratory: Per HPI.  Cardiovascular: Per HPI.  Endocrine: Per HPI.  Musculoskeletal: Per HPI.  Neurological: Per HPI.  Psychiatric/Behavioral: Per HPI.  As documented in HPI, otherwise negative. A Complete review of 10 medical systems was performed today.    HISTORIES  I have reviewed the allergies listed in the medical record as well as the nursing notes for this encounter. The following histories were reviewed and updated as appropriate:    ALLERGIES:  No Known Allergies  Past Medical History:   Diagnosis Date   • Diabetes mellitus (CMS/HCC)    • Disorder of bone and cartilage, unspecified 10/18/2011   • Essential (primary) hypertension    • High cholesterol    • Malignant neoplasm of upper-outer quadrant of right female breast (CMS/HCC) 11/14/2016   • Osteoarthrosis and allied disorders      Past Surgical History:   Procedure Laterality Date   • Colonoscopy  12/14/2005   • Foot fracture surgery Left 4/17/2015    [facility] ORIF   • Joint replacement     • Pap smear  10/31/2012   • Revision total hip arthroplasty  04/04/2011   • Stereotactic breast biopsy Right 11/08/2016    clip #3 placed   • Total hip arthroplasty  10/22/2007      Social History     Socioeconomic History   • Marital status: /Civil Union     Spouse name: Not on file   • Number of children: Not on file   • Years of education: Not on file   • Highest education level: Not on file   Occupational History   • Not on file   Tobacco Use   • Smoking status: Former Smoker     Packs/day: 0.50     Years: 20.00     Pack years: 10.00     Types: Cigarettes     Quit date: 1992     Years since quittin.1   • Smokeless tobacco: Never Used   Substance and Sexual Activity   • Alcohol use: Yes     Alcohol/week: 4.0 standard drinks     Types: 4 Standard drinks or equivalent per week   • Drug use: No   • Sexual activity: Not on file   Other Topics Concern   •  Service No   • Blood Transfusions No   • Caffeine Concern No   • Occupational Exposure No   • Hobby Hazards No   • Sleep Concern No   • Stress Concern Yes   • Weight Concern No   • Special Diet No   • Back Care No   • Exercise No   • Bike Helmet No   • Seat Belt Yes   • Self-Exams Yes   Social History Narrative   • Not on file     Social Determinants of Health     Financial Resource Strain: Not on file   Food Insecurity: Not on file   Transportation Needs: Not on file   Physical Activity: Not on file   Stress: Not on file   Social Connections: Not on file   Intimate Partner Violence: Not on file     Family History   Problem Relation Age of Onset   • Heart disease Father         Coronary artery bypass surgery   • Stroke/TIA Father    • Cancer Brother 37        Hodgkin's lymphoma and prostate cancer (70s)   • Diabetes Mother    • High blood pressure Mother    • Stroke Mother    • Dementia/Alzheimers Mother    • High blood pressure Brother    • Diabetes Brother    • Stroke Brother    • Cancer, Breast Paternal Aunt 50        rad mast and radiation, brain mets   • Cancer, Breast Paternal Aunt 60        d. 80   • Cancer, Breast Other 44        niece, Brother (1) daughter, lobular, living at 57   • Cancer, Breast Other 47         niece, brother (1) daughter, DCIS, BRCA neg reportedly       PHYSICAL EXAM  Vitals:    02/07/22 1021   BP: 118/56   Pulse: 92   Temp: 97.9 °F (36.6 °C)   TempSrc: Temporal   SpO2: 99%   Weight: 66.2 kg (146 lb)   Height: 4' 11\" (1.499 m)     Body mass index is 29.49 kg/m².    Constitutional:  Well developed, overweight, no acute distress, non-toxic appearance   Eyes:  Pupils equal, round, reactive to light, extraocular movements intact and conjunctivae normal   Respiratory:  No tachypnea noted. Good air entry to bilateral lung bases, no wheezes, rales rhonchi or stridor noted. No fluid buildup inside lungs.   Cardiovascular:  Irregular heart rate. No murmurs, no gallops, no rubs   Musculoskeletal:  No extremity edema. Post-traumatic arthritis in mid foot region.   Skin: Appropriate skin turgor for age, no rashes noted.  Neurologic:  Alert & oriented x 3, cranial nerves 2-12 grossly symmetric and intact, strength and sensation symmetric and intact, no focal deficits noted. No tremors, tics or ataxia.  Psychiatric:  Speech and behavior appropriate, affect congruent to mood.   Diabetic Foot Exam Documentation     Normal Bilateral Foot Exam:  Skin integrity is normal. Dorsalis pedis and posterior tibial pulses are present.  Pressure sensation using the [place]-Patricia monofilament is present.      ASSESSMENT AND PLAN:  Melanie was seen today for follow-up and foot.    Diagnoses and all orders for this visit:    Type 2 diabetes mellitus with diabetic nephropathy, without long-term current use of insulin (CMS/HCC)    Need for COVID-19 vaccine  -     COVID-19 MODERNA VACCINE    Acute on chronic right-sided congestive heart failure (CMS/HCC)  Continue diltiazem at this time for Heart rate and blood pressure control. Awaiting evaluation from Cardiology regarding possible treatment options, including tricuspid valve replacement.     Major depressive disorder, single episode, mild (CMS/HCC)  Chronic stable, no new  issues.     Stage 3 chronic kidney disease, unspecified whether stage 3a or 3b CKD (CMS/[facility])  Chronic/stable    Other orders  -     COVID VACCINE MODERNA; Future        Plan:  Type 2 diabetes mellitus with diabetic nephropathy, without long-term current use of insulin (CMS/[facility]):  Overall course of diabetes is improved.   Reviewed and discussed previous lab reports.   Discontinued Metformin 500 mg daily.  Diabetic foot exam is done in the office today.   Reviewed with her  lifestyle modifications to reduce her risk for complications from diabetes and arteriosclerotic cardiovascular disease. Discussed the role of diet, specifically limiting carbohydrate intake. She should maintain a healthy weight and engage in 30 minutes of cardiovascular exercise 5 times a week.  Additionally, she should avoid tobacco, alcohol, caffeine, and stress.    Need for COVID-19 vaccine:  Administered booster dose of COVID-19 vaccine in the office today.    Additional Plans:  Current vital signs are reviewed and discussed.   Reviewed and reconciled current medication list.  Reviewed and discussed echocardiogram report which was done in October 2021. Provided a copy of that as requested.   Limit salt intake within 2000 mg daily.   Maintain adequate hydration.   Recommended doing a midday heart rate check at home.   May continue monitoring weight and sodium intake at home.   Continue care under cardiology.   Discussed risks and benefits of endovascular valve replacement.    Instructions provided as documented in the After Visit Summary.    Body mass index is 29.49 kg/m².    BMI ASSESSMENT/PLAN:  Patient is overweight.    Journal food intake daily, Join health club, Recommend nutrition support group (i.e. Weight Watchers, etc.)  and 30 minutes of physical activity a day       Health Maintenance Due   Topic Date Due   • Shingles Vaccine (1 of 2) Never done   • DTaP/Tdap/Td Vaccine (2 - Td or Tdap) 10/15/2019   • Diabetes Foot Exam  11/18/2020    • Diabetes Eye Exam  10/09/2021   • DM/CKD Microalbumin  03/24/2022   • Medicare Wellness Visit  03/30/2022       Patient's HM topics were discussed today, and she agrees to discuss further at her next annual physical which she was encouraged to schedule soon.     Return in about 8 weeks (around 4/4/2022) for Medicare Wellness Visit, Labs 1 week prior to next visit.    I, Dr. Garth Troncoso, have created a visit summary document based on the audio recording between Dr. Buster Craven MD and this patient for the physician to review, edit as needed, and authenticate.  Creation Date: 2/7/2022        I have reviewed and edited the visit summary above and attest that it is accurate.  Electronically Signed by: Dr. Buster Craven on 2/8/2022 at 9:35 PM.       75
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
## 2025-04-01 NOTE — OB NEONATOLOGY/PEDIATRICIAN DELIVERY SUMMARY - NSDELIVERYTYPEA_OBGYN_ALL_OB
Endoscope was pre-cleaned at bedside immediately following procedure by Shanell Arthur       Vaginal Delivery Started first trimester